# Patient Record
Sex: MALE | Race: WHITE | NOT HISPANIC OR LATINO | URBAN - METROPOLITAN AREA
[De-identification: names, ages, dates, MRNs, and addresses within clinical notes are randomized per-mention and may not be internally consistent; named-entity substitution may affect disease eponyms.]

---

## 2017-09-17 ENCOUNTER — EMERGENCY (EMERGENCY)
Facility: HOSPITAL | Age: 57
LOS: 1 days | Discharge: HOME | End: 2017-09-17
Admitting: EMERGENCY MEDICINE
Payer: MEDICAID

## 2017-09-17 PROCEDURE — 99285 EMERGENCY DEPT VISIT HI MDM: CPT

## 2017-09-17 PROCEDURE — 74176 CT ABD & PELVIS W/O CONTRAST: CPT | Mod: 26

## 2017-09-18 PROCEDURE — 85610 PROTHROMBIN TIME: CPT

## 2017-09-18 PROCEDURE — 74176 CT ABD & PELVIS W/O CONTRAST: CPT

## 2017-09-18 PROCEDURE — 99284 EMERGENCY DEPT VISIT MOD MDM: CPT | Mod: 25

## 2017-09-18 PROCEDURE — 80048 BASIC METABOLIC PNL TOTAL CA: CPT

## 2017-09-18 PROCEDURE — 85027 COMPLETE CBC AUTOMATED: CPT

## 2017-09-18 PROCEDURE — 96374 THER/PROPH/DIAG INJ IV PUSH: CPT

## 2017-09-18 PROCEDURE — 81003 URINALYSIS AUTO W/O SCOPE: CPT

## 2017-09-18 PROCEDURE — 85730 THROMBOPLASTIN TIME PARTIAL: CPT

## 2018-04-18 ENCOUNTER — EMERGENCY (EMERGENCY)
Facility: HOSPITAL | Age: 58
LOS: 1 days | Discharge: HOME | End: 2018-04-18
Admitting: EMERGENCY MEDICINE
Payer: COMMERCIAL

## 2018-04-18 PROCEDURE — 99283 EMERGENCY DEPT VISIT LOW MDM: CPT

## 2018-04-18 PROCEDURE — 73502 X-RAY EXAM HIP UNI 2-3 VIEWS: CPT | Mod: 26,LT

## 2018-04-19 PROCEDURE — 96372 THER/PROPH/DIAG INJ SC/IM: CPT

## 2018-04-19 PROCEDURE — 99283 EMERGENCY DEPT VISIT LOW MDM: CPT | Mod: 25

## 2018-04-19 PROCEDURE — 73502 X-RAY EXAM HIP UNI 2-3 VIEWS: CPT

## 2018-12-24 ENCOUNTER — APPOINTMENT (OUTPATIENT)
Dept: INTERNAL MEDICINE | Facility: CLINIC | Age: 58
End: 2018-12-24
Payer: COMMERCIAL

## 2018-12-24 VITALS
RESPIRATION RATE: 15 BRPM | SYSTOLIC BLOOD PRESSURE: 134 MMHG | HEIGHT: 72 IN | DIASTOLIC BLOOD PRESSURE: 78 MMHG | BODY MASS INDEX: 42.66 KG/M2 | WEIGHT: 315 LBS | HEART RATE: 74 BPM

## 2018-12-24 DIAGNOSIS — Z87.891 PERSONAL HISTORY OF NICOTINE DEPENDENCE: ICD-10-CM

## 2018-12-24 PROCEDURE — 99215 OFFICE O/P EST HI 40 MIN: CPT

## 2018-12-26 ENCOUNTER — APPOINTMENT (OUTPATIENT)
Dept: INTERNAL MEDICINE | Facility: CLINIC | Age: 58
End: 2018-12-26

## 2018-12-26 NOTE — REVIEW OF SYSTEMS
[Hoarseness] : hoarseness [Heartburn] : heartburn [Fever] : no fever [Chills] : no chills [Fatigue] : no fatigue [Hot Flashes] : no hot flashes [Night Sweats] : no night sweats [Recent Change In Weight] : ~T no recent weight change [Discharge] : no discharge [Pain] : no pain [Redness] : no redness [Dryness] : no dryness [Vision Problems] : no vision problems [Itching] : no itching [Earache] : no earache [Hearing Loss] : no hearing loss [Nosebleeds] : no nosebleeds [Postnasal Drip] : no postnasal drip [Nasal Discharge] : no nasal discharge [Sore Throat] : no sore throat [Chest Pain] : no chest pain [Palpitations] : no palpitations [Claudication] : no  leg claudication [Lower Ext Edema] : no lower extremity edema [Orthopena] : no orthopnea [Paroysmal Nocturnal Dyspnea] : no paroysmal nocturnal dyspnea [Shortness Of Breath] : no shortness of breath [Wheezing] : no wheezing [Cough] : no cough [Dyspnea on Exertion] : not dyspnea on exertion [Abdominal Pain] : no abdominal pain [Nausea] : no nausea [Constipation] : no constipation [Diarrhea] : no diarrhea [Vomiting] : no vomiting [Melena] : no melena [Dysuria] : no dysuria [Incontinence] : no incontinence [Hesitancy] : no hesitancy [Nocturia] : no nocturia [Hematuria] : no hematuria [Frequency] : no frequency [Impotence] : no impotency [Poor Libido] : libido not poor [Joint Pain] : no joint pain [Joint Stiffness] : no joint stiffness [Muscle Pain] : no muscle pain [Muscle Weakness] : no muscle weakness [Back Pain] : no back pain [Joint Swelling] : no joint swelling [Mole Changes] : no mole changes [Nail Changes] : no nail changes [Hair Changes] : no hair changes [Skin Rash] : no skin rash [Headache] : no headache [Dizziness] : no dizziness [Fainting] : no fainting [Confusion] : no confusion [Unsteady Walk] : no ataxia [Memory Loss] : no memory loss [Suicidal] : not suicidal [Insomnia] : no insomnia [Anxiety] : no anxiety [Depression] : no depression [Easy Bleeding] : no easy bleeding [Easy Bruising] : no easy bruising [Swollen Glands] : no swollen glands

## 2018-12-26 NOTE — HEALTH RISK ASSESSMENT
[No falls in past year] : Patient reported no falls in the past year [0] : 2) Feeling down, depressed, or hopeless: Not at all (0) [HIV test declined] : HIV test declined [Hepatitis C test declined] : Hepatitis C test declined [[id number]] : 0

## 2018-12-26 NOTE — HISTORY OF PRESENT ILLNESS
[FreeTextEntry1] : Comes to the office for gastroenterology consultation referred by Dr. Monica Wolfe with chief complaint of chronic hoarse voice [de-identified] : 58-year-old man comes to the office for a gastroenterology consultation referred by his primary care provider Dr. Monica Wolfe patient has been troubled by hoarse Voice for several months. His primary care provider has tried medicines to treat allergies without success he does have occasional heartburn referred as to the possibility that his hoarseness is related to GERD denies abdominal pain he said no dysphagia no nausea no vomiting no diarrhea no constipation no primary blood per rectum or black stools denies chest pain shortness of breath exertional dyspnea lightheadedness palpitations dizziness vertigo or syncope denies any significant musculoskeletal complaints

## 2018-12-26 NOTE — ASSESSMENT
[FreeTextEntry1] : Physical exam shows a well-developed man in no acute distress height was 6 feet weight 340 pounds blood pressure 134/78 heart rate 74 respirations at 15. HEENT was unremarkable chest was clear cardiovascular exam was regular abdomen was soft murmur extremities showed no clubbing cyanosis or edema neurologic exam was nonfocal.............. patient recommended initially to see an ear nose and throat physician to evaluate the vocal cords and the pharynx long discussion about the triggers for acid reflux including eating too fast eating late at night poor dietary choices limiting caffeine chocolate citrus fruits and juices and spicy and fried foods he will try to make some improvement in these areas but in the meantime we have started pantoprazole 40 mg twice a day and he will return to us in 2-3 weeks tentative endoscopy has been scheduled for mid January

## 2019-01-11 ENCOUNTER — APPOINTMENT (OUTPATIENT)
Dept: INTERNAL MEDICINE | Facility: CLINIC | Age: 59
End: 2019-01-11

## 2019-01-23 ENCOUNTER — APPOINTMENT (OUTPATIENT)
Dept: INTERNAL MEDICINE | Facility: HOSPITAL | Age: 59
End: 2019-01-23

## 2019-01-25 ENCOUNTER — APPOINTMENT (OUTPATIENT)
Dept: OTOLARYNGOLOGY | Facility: CLINIC | Age: 59
End: 2019-01-25
Payer: MEDICAID

## 2019-01-25 VITALS
HEART RATE: 105 BPM | BODY MASS INDEX: 42.66 KG/M2 | HEIGHT: 72 IN | OXYGEN SATURATION: 96 % | WEIGHT: 315 LBS | RESPIRATION RATE: 16 BRPM

## 2019-01-25 PROCEDURE — 31575 DIAGNOSTIC LARYNGOSCOPY: CPT

## 2019-01-25 PROCEDURE — 99204 OFFICE O/P NEW MOD 45 MIN: CPT | Mod: 25

## 2019-01-27 NOTE — HISTORY OF PRESENT ILLNESS
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

## 2019-01-27 NOTE — REASON FOR VISIT
[Initial Consultation] : an initial consultation for [FreeTextEntry2] : new patient horseness in the throat for the past 5 months

## 2019-01-27 NOTE — CONSULT LETTER
[Dear  ___] : Dear  [unfilled], [Consult Letter:] : I had the pleasure of evaluating your patient, [unfilled]. [Please see my note below.] : Please see my note below. [Consult Closing:] : Thank you very much for allowing me to participate in the care of this patient.  If you have any questions, please do not hesitate to contact me. [Sincerely,] : Sincerely, [FreeTextEntry2] : Dr Enid Rodriguez. [FreeTextEntry3] : \par Jay Camara MD, FACS\par \par Otolaryngology-Head and Neck Surgery\par [employer] [facility]\par

## 2019-02-01 ENCOUNTER — OUTPATIENT (OUTPATIENT)
Dept: OUTPATIENT SERVICES | Facility: HOSPITAL | Age: 59
LOS: 1 days | End: 2019-02-01
Payer: MEDICAID

## 2019-02-01 VITALS
RESPIRATION RATE: 16 BRPM | OXYGEN SATURATION: 98 % | TEMPERATURE: 98 F | HEART RATE: 95 BPM | HEIGHT: 70.25 IN | DIASTOLIC BLOOD PRESSURE: 78 MMHG | SYSTOLIC BLOOD PRESSURE: 156 MMHG | WEIGHT: 315 LBS

## 2019-02-01 DIAGNOSIS — J38.3 OTHER DISEASES OF VOCAL CORDS: ICD-10-CM

## 2019-02-01 DIAGNOSIS — E11.9 TYPE 2 DIABETES MELLITUS WITHOUT COMPLICATIONS: ICD-10-CM

## 2019-02-01 DIAGNOSIS — K08.409 PARTIAL LOSS OF TEETH, UNSPECIFIED CAUSE, UNSPECIFIED CLASS: Chronic | ICD-10-CM

## 2019-02-01 DIAGNOSIS — I10 ESSENTIAL (PRIMARY) HYPERTENSION: ICD-10-CM

## 2019-02-01 DIAGNOSIS — E66.01 MORBID (SEVERE) OBESITY DUE TO EXCESS CALORIES: ICD-10-CM

## 2019-02-01 DIAGNOSIS — Z98.890 OTHER SPECIFIED POSTPROCEDURAL STATES: Chronic | ICD-10-CM

## 2019-02-01 LAB
ANION GAP SERPL CALC-SCNC: 11 MMO/L — SIGNIFICANT CHANGE UP (ref 7–14)
BUN SERPL-MCNC: 11 MG/DL — SIGNIFICANT CHANGE UP (ref 7–23)
CALCIUM SERPL-MCNC: 10 MG/DL — SIGNIFICANT CHANGE UP (ref 8.4–10.5)
CHLORIDE SERPL-SCNC: 102 MMOL/L — SIGNIFICANT CHANGE UP (ref 98–107)
CO2 SERPL-SCNC: 27 MMOL/L — SIGNIFICANT CHANGE UP (ref 22–31)
CREAT SERPL-MCNC: 0.77 MG/DL — SIGNIFICANT CHANGE UP (ref 0.5–1.3)
GLUCOSE SERPL-MCNC: 197 MG/DL — HIGH (ref 70–99)
HBA1C BLD-MCNC: 6.7 % — HIGH (ref 4–5.6)
HCT VFR BLD CALC: 43 % — SIGNIFICANT CHANGE UP (ref 39–50)
HGB BLD-MCNC: 14.1 G/DL — SIGNIFICANT CHANGE UP (ref 13–17)
MCHC RBC-ENTMCNC: 29.5 PG — SIGNIFICANT CHANGE UP (ref 27–34)
MCHC RBC-ENTMCNC: 32.8 % — SIGNIFICANT CHANGE UP (ref 32–36)
MCV RBC AUTO: 90 FL — SIGNIFICANT CHANGE UP (ref 80–100)
NRBC # FLD: 0 K/UL — LOW (ref 25–125)
PLATELET # BLD AUTO: 186 K/UL — SIGNIFICANT CHANGE UP (ref 150–400)
PMV BLD: 12.3 FL — SIGNIFICANT CHANGE UP (ref 7–13)
POTASSIUM SERPL-MCNC: 4.7 MMOL/L — SIGNIFICANT CHANGE UP (ref 3.5–5.3)
POTASSIUM SERPL-SCNC: 4.7 MMOL/L — SIGNIFICANT CHANGE UP (ref 3.5–5.3)
RBC # BLD: 4.78 M/UL — SIGNIFICANT CHANGE UP (ref 4.2–5.8)
RBC # FLD: 12.6 % — SIGNIFICANT CHANGE UP (ref 10.3–14.5)
SODIUM SERPL-SCNC: 140 MMOL/L — SIGNIFICANT CHANGE UP (ref 135–145)
WBC # BLD: 7.7 K/UL — SIGNIFICANT CHANGE UP (ref 3.8–10.5)
WBC # FLD AUTO: 7.7 K/UL — SIGNIFICANT CHANGE UP (ref 3.8–10.5)

## 2019-02-01 PROCEDURE — 93010 ELECTROCARDIOGRAM REPORT: CPT

## 2019-02-01 RX ORDER — SODIUM CHLORIDE 9 MG/ML
3 INJECTION INTRAMUSCULAR; INTRAVENOUS; SUBCUTANEOUS EVERY 8 HOURS
Qty: 0 | Refills: 0 | Status: DISCONTINUED | OUTPATIENT
Start: 2019-02-16 | End: 2019-03-03

## 2019-02-01 RX ORDER — SODIUM CHLORIDE 9 MG/ML
1000 INJECTION, SOLUTION INTRAVENOUS
Qty: 0 | Refills: 0 | Status: DISCONTINUED | OUTPATIENT
Start: 2019-02-16 | End: 2019-03-03

## 2019-02-01 NOTE — H&P PST ADULT - PROBLEM SELECTOR PLAN 1
Direct Laryngoscopy with Biopsy scheduled on 2/13/19.   Pre-op instructions provided. Pt verbalized understanding.   Pepcid provided for GI prophylaxis.

## 2019-02-01 NOTE — H&P PST ADULT - NSANTHOSAYNRD_GEN_A_CORE
No. SALVATORE screening performed.  STOP BANG Legend: 0-2 = LOW Risk; 3-4 = INTERMEDIATE Risk; 5-8 = HIGH Risk

## 2019-02-01 NOTE — H&P PST ADULT - HISTORY OF PRESENT ILLNESS
58 year old male with c/o hoarseness of voice x 6 months, had laryngoscopy which revealed leukoplasia. Pt presents today for presurgical evaluation for ... 58 year old male with c/o hoarseness of voice x 6 months, had laryngoscopy which revealed leukoplasia. Pt presents today for presurgical evaluation for Direct Laryngoscopy with Biopsy scheduled on 2/13/19.

## 2019-02-11 ENCOUNTER — RESULT REVIEW (OUTPATIENT)
Age: 59
End: 2019-02-11

## 2019-02-15 ENCOUNTER — TRANSCRIPTION ENCOUNTER (OUTPATIENT)
Age: 59
End: 2019-02-15

## 2019-02-16 ENCOUNTER — OUTPATIENT (OUTPATIENT)
Dept: OUTPATIENT SERVICES | Facility: HOSPITAL | Age: 59
LOS: 1 days | Discharge: HOME | End: 2019-02-16
Payer: MEDICAID

## 2019-02-16 ENCOUNTER — APPOINTMENT (OUTPATIENT)
Dept: OTOLARYNGOLOGY | Facility: HOSPITAL | Age: 59
End: 2019-02-16

## 2019-02-16 VITALS
HEIGHT: 70.25 IN | RESPIRATION RATE: 15 BRPM | WEIGHT: 315 LBS | TEMPERATURE: 98 F | OXYGEN SATURATION: 95 % | HEART RATE: 102 BPM | DIASTOLIC BLOOD PRESSURE: 77 MMHG | SYSTOLIC BLOOD PRESSURE: 145 MMHG

## 2019-02-16 VITALS
DIASTOLIC BLOOD PRESSURE: 77 MMHG | SYSTOLIC BLOOD PRESSURE: 141 MMHG | RESPIRATION RATE: 12 BRPM | HEART RATE: 97 BPM | OXYGEN SATURATION: 95 %

## 2019-02-16 DIAGNOSIS — J38.3 OTHER DISEASES OF VOCAL CORDS: ICD-10-CM

## 2019-02-16 DIAGNOSIS — K08.409 PARTIAL LOSS OF TEETH, UNSPECIFIED CAUSE, UNSPECIFIED CLASS: Chronic | ICD-10-CM

## 2019-02-16 DIAGNOSIS — Z98.890 OTHER SPECIFIED POSTPROCEDURAL STATES: Chronic | ICD-10-CM

## 2019-02-16 PROCEDURE — 31536 LARYNGOSCOPY W/BX & OP SCOPE: CPT

## 2019-02-16 RX ORDER — ACETAMINOPHEN 500 MG
2 TABLET ORAL
Qty: 0 | Refills: 0 | DISCHARGE
Start: 2019-02-16

## 2019-02-16 RX ORDER — ACETAMINOPHEN 500 MG
650 TABLET ORAL EVERY 6 HOURS
Qty: 0 | Refills: 0 | Status: DISCONTINUED | OUTPATIENT
Start: 2019-02-16 | End: 2019-03-03

## 2019-02-16 NOTE — ASU DISCHARGE PLAN (ADULT/PEDIATRIC). - NOTIFY
Pain not relieved by Medications/Inability to Tolerate Liquids or Foods/Swelling that continues/Persistent Nausea and Vomiting/Fever greater than 101/Bleeding that does not stop

## 2019-02-16 NOTE — ASU DISCHARGE PLAN (ADULT/PEDIATRIC). - SPECIAL INSTRUCTIONS
Regular diet.   Tylenol or motrin for pain control as needed.   Continue home medications  Follow up with Dr. Camara in 1-2 weeks to discuss results.

## 2019-02-16 NOTE — ASU DISCHARGE PLAN (ADULT/PEDIATRIC). - MEDICATION SUMMARY - MEDICATIONS TO TAKE
I will START or STAY ON the medications listed below when I get home from the hospital:    acetaminophen 325 mg oral tablet  -- 2 tab(s) by mouth every 6 hours, As needed, Mild Pain (1 - 3), Moderate Pain (4 - 6), Severe Pain (7 - 10)  -- Indication: For pain control    lisinopril 40 mg oral tablet  -- 1 tab(s) by mouth once a day in am  -- Indication: For home med    Victoza 18 mg/3 mL subcutaneous solution  -- subcutaneous once a day in am  -- Indication: For home med    amLODIPine 10 mg oral tablet  -- 1 tab(s) by mouth once a day in  am  -- Indication: For home med

## 2019-02-17 ENCOUNTER — RESULT REVIEW (OUTPATIENT)
Age: 59
End: 2019-02-17

## 2019-02-17 PROCEDURE — 88305 TISSUE EXAM BY PATHOLOGIST: CPT | Mod: 26

## 2019-02-20 PROBLEM — I10 ESSENTIAL (PRIMARY) HYPERTENSION: Chronic | Status: ACTIVE | Noted: 2019-02-01

## 2019-02-20 PROBLEM — E11.9 TYPE 2 DIABETES MELLITUS WITHOUT COMPLICATIONS: Chronic | Status: ACTIVE | Noted: 2019-02-01

## 2019-02-20 PROBLEM — E66.01 MORBID (SEVERE) OBESITY DUE TO EXCESS CALORIES: Chronic | Status: ACTIVE | Noted: 2019-02-01

## 2019-02-28 ENCOUNTER — APPOINTMENT (OUTPATIENT)
Dept: OTOLARYNGOLOGY | Facility: CLINIC | Age: 59
End: 2019-02-28
Payer: MEDICAID

## 2019-02-28 ENCOUNTER — OUTPATIENT (OUTPATIENT)
Dept: OUTPATIENT SERVICES | Facility: HOSPITAL | Age: 59
LOS: 1 days | Discharge: HOME | End: 2019-02-28

## 2019-02-28 VITALS
BODY MASS INDEX: 42.66 KG/M2 | WEIGHT: 315 LBS | HEART RATE: 102 BPM | DIASTOLIC BLOOD PRESSURE: 83 MMHG | SYSTOLIC BLOOD PRESSURE: 145 MMHG | HEIGHT: 72 IN

## 2019-02-28 DIAGNOSIS — Z98.890 OTHER SPECIFIED POSTPROCEDURAL STATES: Chronic | ICD-10-CM

## 2019-02-28 DIAGNOSIS — K08.409 PARTIAL LOSS OF TEETH, UNSPECIFIED CAUSE, UNSPECIFIED CLASS: Chronic | ICD-10-CM

## 2019-02-28 PROCEDURE — 31575 DIAGNOSTIC LARYNGOSCOPY: CPT

## 2019-02-28 PROCEDURE — 99213 OFFICE O/P EST LOW 20 MIN: CPT | Mod: 25

## 2019-02-28 RX ORDER — NALTREXONE HYDROCHLORIDE AND BUPROPION HYDROCHLORIDE 8; 90 MG/1; MG/1
8-90 TABLET, EXTENDED RELEASE ORAL
Qty: 120 | Refills: 0 | Status: COMPLETED | COMMUNITY
Start: 2018-10-30 | End: 2019-02-28

## 2019-03-03 NOTE — HISTORY OF PRESENT ILLNESS
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 Feels like voice is slightly worse after the procedure. Tolerating PO diet.\par Complete review of systems which was performed during a previous encounter was reviewed with the patient and there are no changes except as stated in the HPI section.\par

## 2019-03-03 NOTE — CONSULT LETTER
[Dear  ___] : Dear  [unfilled], [Courtesy Letter:] : I had the pleasure of seeing your patient, [unfilled], in my office today. [Please see my note below.] : Please see my note below. [Consult Closing:] : Thank you very much for allowing me to participate in the care of this patient.  If you have any questions, please do not hesitate to contact me. [Sincerely,] : Sincerely, [FreeTextEntry2] : Dr Enid Rodriguez.  [FreeTextEntry3] : \par Jay Camara MD, FACS\par \par Otolaryngology-Head and Neck Surgery\par [employer] [facility]\par

## 2019-03-03 NOTE — REASON FOR VISIT
[Subsequent Evaluation] : a subsequent evaluation for [FreeTextEntry2] : s/p DL bx of L VC on 2/17/19

## 2019-03-11 DIAGNOSIS — C32.9 MALIGNANT NEOPLASM OF LARYNX, UNSPECIFIED: ICD-10-CM

## 2019-03-26 ENCOUNTER — APPOINTMENT (OUTPATIENT)
Dept: OTOLARYNGOLOGY | Facility: CLINIC | Age: 59
End: 2019-03-26

## 2020-07-15 ENCOUNTER — APPOINTMENT (OUTPATIENT)
Dept: SURGERY | Facility: CLINIC | Age: 60
End: 2020-07-15
Payer: MEDICAID

## 2020-07-15 DIAGNOSIS — Z12.11 ENCOUNTER FOR SCREENING FOR MALIGNANT NEOPLASM OF COLON: ICD-10-CM

## 2020-07-15 PROCEDURE — 99203 OFFICE O/P NEW LOW 30 MIN: CPT

## 2020-07-15 NOTE — HISTORY OF PRESENT ILLNESS
[de-identified] : This 59 year old man last underwent a colonoscopy over seven years ago. There is no FH of colon CA. The patient recently developed several days of crampy abdominal pain.

## 2020-07-15 NOTE — PHYSICAL EXAM
[Normal Breath Sounds] : Normal breath sounds [Normal Heart Sounds] : normal heart sounds [Normal Rate and Rhythm] : normal rate and rhythm [Abdominal Masses] : No abdominal masses [Abdomen Tenderness] : ~T ~M No abdominal tenderness [No Rash or Lesion] : No rash or lesion [de-identified] : nl [de-identified] : nl [de-identified] : nl

## 2020-07-15 NOTE — ASSESSMENT
[FreeTextEntry1] : Long discussion regarding all options and risks\par Bowel prep written and explained\par Scheduled for colonoscopy on 7/28/20\par

## 2020-07-25 ENCOUNTER — LABORATORY RESULT (OUTPATIENT)
Age: 60
End: 2020-07-25

## 2020-07-27 ENCOUNTER — TRANSCRIPTION ENCOUNTER (OUTPATIENT)
Age: 60
End: 2020-07-27

## 2020-07-28 ENCOUNTER — OUTPATIENT (OUTPATIENT)
Dept: OUTPATIENT SERVICES | Facility: HOSPITAL | Age: 60
LOS: 1 days | End: 2020-07-28
Payer: MEDICAID

## 2020-07-28 DIAGNOSIS — K08.409 PARTIAL LOSS OF TEETH, UNSPECIFIED CAUSE, UNSPECIFIED CLASS: Chronic | ICD-10-CM

## 2020-07-28 DIAGNOSIS — Z98.890 OTHER SPECIFIED POSTPROCEDURAL STATES: Chronic | ICD-10-CM

## 2020-07-28 DIAGNOSIS — Z12.11 ENCOUNTER FOR SCREENING FOR MALIGNANT NEOPLASM OF COLON: ICD-10-CM

## 2020-07-28 LAB — GLUCOSE BLDC GLUCOMTR-MCNC: 131 MG/DL — HIGH (ref 70–99)

## 2020-07-28 PROCEDURE — 45378 DIAGNOSTIC COLONOSCOPY: CPT | Mod: 33

## 2020-07-28 PROCEDURE — 82962 GLUCOSE BLOOD TEST: CPT

## 2020-07-28 PROCEDURE — G0121: CPT

## 2020-07-28 RX ORDER — SODIUM CHLORIDE 9 MG/ML
1000 INJECTION INTRAMUSCULAR; INTRAVENOUS; SUBCUTANEOUS
Refills: 0 | Status: DISCONTINUED | OUTPATIENT
Start: 2020-07-28 | End: 2020-08-12

## 2020-07-28 RX ADMIN — SODIUM CHLORIDE 75 MILLILITER(S): 9 INJECTION INTRAMUSCULAR; INTRAVENOUS; SUBCUTANEOUS at 12:49

## 2020-12-23 PROBLEM — Z12.11 ENCOUNTER FOR SCREENING COLONOSCOPY: Status: RESOLVED | Noted: 2020-07-15 | Resolved: 2020-12-23

## 2021-03-22 NOTE — H&P PST ADULT - NEGATIVE SKIN SYMPTOMS
Pt's daughter called to inquire about details of what single agent pembro administration is like. Also asking about FMLA forms for her sister. Forms were faxed on 3/3, will verify # and resend. Discussed admin with Youa and will email via oncology information.  Got fax # of [phone] from [employer]. Answered all questions about Keytruda administration.   no change in size/color of mole/no rash/no itching/no dryness

## 2021-05-21 ENCOUNTER — EMERGENCY (EMERGENCY)
Facility: HOSPITAL | Age: 61
LOS: 1 days | Discharge: HOME | End: 2021-05-21
Attending: INTERNAL MEDICINE | Admitting: INTERNAL MEDICINE
Payer: MEDICAID

## 2021-05-21 VITALS
DIASTOLIC BLOOD PRESSURE: 91 MMHG | OXYGEN SATURATION: 97 % | WEIGHT: 315 LBS | TEMPERATURE: 98 F | SYSTOLIC BLOOD PRESSURE: 152 MMHG | HEIGHT: 70.25 IN | HEART RATE: 115 BPM | RESPIRATION RATE: 20 BRPM

## 2021-05-21 VITALS — HEART RATE: 101 BPM | DIASTOLIC BLOOD PRESSURE: 69 MMHG | SYSTOLIC BLOOD PRESSURE: 129 MMHG | TEMPERATURE: 99 F

## 2021-05-21 DIAGNOSIS — Z98.890 OTHER SPECIFIED POSTPROCEDURAL STATES: Chronic | ICD-10-CM

## 2021-05-21 DIAGNOSIS — K08.409 PARTIAL LOSS OF TEETH, UNSPECIFIED CAUSE, UNSPECIFIED CLASS: Chronic | ICD-10-CM

## 2021-05-21 LAB
ALBUMIN SERPL ELPH-MCNC: 3.7 G/DL — SIGNIFICANT CHANGE UP (ref 3.3–5)
ALP SERPL-CCNC: 106 U/L — SIGNIFICANT CHANGE UP (ref 40–120)
ALT FLD-CCNC: 68 U/L — HIGH (ref 10–45)
ANION GAP SERPL CALC-SCNC: 10 MMOL/L — SIGNIFICANT CHANGE UP (ref 5–17)
APPEARANCE UR: CLEAR — SIGNIFICANT CHANGE UP
AST SERPL-CCNC: 58 U/L — HIGH (ref 10–40)
BASOPHILS # BLD AUTO: 0.07 K/UL — SIGNIFICANT CHANGE UP (ref 0–0.2)
BASOPHILS NFR BLD AUTO: 0.7 % — SIGNIFICANT CHANGE UP (ref 0–2)
BILIRUB SERPL-MCNC: 0.4 MG/DL — SIGNIFICANT CHANGE UP (ref 0.2–1.2)
BILIRUB UR-MCNC: NEGATIVE — SIGNIFICANT CHANGE UP
BUN SERPL-MCNC: 12 MG/DL — SIGNIFICANT CHANGE UP (ref 7–23)
CALCIUM SERPL-MCNC: 9.3 MG/DL — SIGNIFICANT CHANGE UP (ref 8.4–10.5)
CHLORIDE SERPL-SCNC: 103 MMOL/L — SIGNIFICANT CHANGE UP (ref 96–108)
CO2 SERPL-SCNC: 27 MMOL/L — SIGNIFICANT CHANGE UP (ref 22–31)
COLOR SPEC: YELLOW — SIGNIFICANT CHANGE UP
CREAT SERPL-MCNC: 0.85 MG/DL — SIGNIFICANT CHANGE UP (ref 0.5–1.3)
DIFF PNL FLD: NEGATIVE — SIGNIFICANT CHANGE UP
EOSINOPHIL # BLD AUTO: 0.13 K/UL — SIGNIFICANT CHANGE UP (ref 0–0.5)
EOSINOPHIL NFR BLD AUTO: 1.4 % — SIGNIFICANT CHANGE UP (ref 0–6)
GLUCOSE SERPL-MCNC: 207 MG/DL — HIGH (ref 70–99)
GLUCOSE UR QL: 50 MG/DL
HCT VFR BLD CALC: 44.4 % — SIGNIFICANT CHANGE UP (ref 39–50)
HGB BLD-MCNC: 14.8 G/DL — SIGNIFICANT CHANGE UP (ref 13–17)
IMM GRANULOCYTES NFR BLD AUTO: 0.4 % — SIGNIFICANT CHANGE UP (ref 0–1.5)
KETONES UR-MCNC: NEGATIVE — SIGNIFICANT CHANGE UP
LEUKOCYTE ESTERASE UR-ACNC: NEGATIVE — SIGNIFICANT CHANGE UP
LYMPHOCYTES # BLD AUTO: 1.22 K/UL — SIGNIFICANT CHANGE UP (ref 1–3.3)
LYMPHOCYTES # BLD AUTO: 13 % — SIGNIFICANT CHANGE UP (ref 13–44)
MCHC RBC-ENTMCNC: 29.9 PG — SIGNIFICANT CHANGE UP (ref 27–34)
MCHC RBC-ENTMCNC: 33.3 GM/DL — SIGNIFICANT CHANGE UP (ref 32–36)
MCV RBC AUTO: 89.7 FL — SIGNIFICANT CHANGE UP (ref 80–100)
MONOCYTES # BLD AUTO: 0.68 K/UL — SIGNIFICANT CHANGE UP (ref 0–0.9)
MONOCYTES NFR BLD AUTO: 7.2 % — SIGNIFICANT CHANGE UP (ref 2–14)
NEUTROPHILS # BLD AUTO: 7.24 K/UL — SIGNIFICANT CHANGE UP (ref 1.8–7.4)
NEUTROPHILS NFR BLD AUTO: 77.3 % — HIGH (ref 43–77)
NITRITE UR-MCNC: NEGATIVE — SIGNIFICANT CHANGE UP
NRBC # BLD: 0 /100 WBCS — SIGNIFICANT CHANGE UP (ref 0–0)
NT-PROBNP SERPL-SCNC: 131 PG/ML — SIGNIFICANT CHANGE UP (ref 0–300)
PH UR: 6 — SIGNIFICANT CHANGE UP (ref 5–8)
PLATELET # BLD AUTO: 223 K/UL — SIGNIFICANT CHANGE UP (ref 150–400)
POTASSIUM SERPL-MCNC: 4.3 MMOL/L — SIGNIFICANT CHANGE UP (ref 3.5–5.3)
POTASSIUM SERPL-SCNC: 4.3 MMOL/L — SIGNIFICANT CHANGE UP (ref 3.5–5.3)
PROT SERPL-MCNC: 7.6 G/DL — SIGNIFICANT CHANGE UP (ref 6–8.3)
PROT UR-MCNC: NEGATIVE — SIGNIFICANT CHANGE UP
RBC # BLD: 4.95 M/UL — SIGNIFICANT CHANGE UP (ref 4.2–5.8)
RBC # FLD: 12 % — SIGNIFICANT CHANGE UP (ref 10.3–14.5)
SARS-COV-2 RNA SPEC QL NAA+PROBE: SIGNIFICANT CHANGE UP
SODIUM SERPL-SCNC: 140 MMOL/L — SIGNIFICANT CHANGE UP (ref 135–145)
SP GR SPEC: 1.02 — SIGNIFICANT CHANGE UP (ref 1.01–1.02)
TROPONIN I SERPL-MCNC: <.017 NG/ML — LOW (ref 0.02–0.06)
TROPONIN I SERPL-MCNC: <.017 NG/ML — LOW (ref 0.02–0.06)
UROBILINOGEN FLD QL: NEGATIVE — SIGNIFICANT CHANGE UP
WBC # BLD: 9.38 K/UL — SIGNIFICANT CHANGE UP (ref 3.8–10.5)
WBC # FLD AUTO: 9.38 K/UL — SIGNIFICANT CHANGE UP (ref 3.8–10.5)

## 2021-05-21 PROCEDURE — 93010 ELECTROCARDIOGRAM REPORT: CPT

## 2021-05-21 PROCEDURE — 71045 X-RAY EXAM CHEST 1 VIEW: CPT

## 2021-05-21 PROCEDURE — 71275 CT ANGIOGRAPHY CHEST: CPT

## 2021-05-21 PROCEDURE — 71275 CT ANGIOGRAPHY CHEST: CPT | Mod: 26,MA

## 2021-05-21 PROCEDURE — 99284 EMERGENCY DEPT VISIT MOD MDM: CPT | Mod: 25

## 2021-05-21 PROCEDURE — 87635 SARS-COV-2 COVID-19 AMP PRB: CPT

## 2021-05-21 PROCEDURE — 80053 COMPREHEN METABOLIC PANEL: CPT

## 2021-05-21 PROCEDURE — 36415 COLL VENOUS BLD VENIPUNCTURE: CPT

## 2021-05-21 PROCEDURE — 93005 ELECTROCARDIOGRAM TRACING: CPT

## 2021-05-21 PROCEDURE — 84484 ASSAY OF TROPONIN QUANT: CPT

## 2021-05-21 PROCEDURE — 83880 ASSAY OF NATRIURETIC PEPTIDE: CPT

## 2021-05-21 PROCEDURE — 71045 X-RAY EXAM CHEST 1 VIEW: CPT | Mod: 26

## 2021-05-21 PROCEDURE — 99285 EMERGENCY DEPT VISIT HI MDM: CPT

## 2021-05-21 PROCEDURE — 85025 COMPLETE CBC W/AUTO DIFF WBC: CPT

## 2021-05-21 PROCEDURE — 96374 THER/PROPH/DIAG INJ IV PUSH: CPT | Mod: XU

## 2021-05-21 RX ORDER — METOPROLOL TARTRATE 50 MG
1 TABLET ORAL
Qty: 30 | Refills: 0
Start: 2021-05-21 | End: 2021-06-19

## 2021-05-21 RX ORDER — METOPROLOL TARTRATE 50 MG
50 TABLET ORAL DAILY
Refills: 0 | Status: DISCONTINUED | OUTPATIENT
Start: 2021-05-21 | End: 2021-05-25

## 2021-05-21 RX ORDER — LISINOPRIL 2.5 MG/1
1 TABLET ORAL
Qty: 0 | Refills: 0 | DISCHARGE

## 2021-05-21 RX ORDER — LIRAGLUTIDE 6 MG/ML
0 INJECTION SUBCUTANEOUS
Qty: 0 | Refills: 0 | DISCHARGE

## 2021-05-21 RX ORDER — DILTIAZEM HCL 120 MG
20 CAPSULE, EXT RELEASE 24 HR ORAL ONCE
Refills: 0 | Status: COMPLETED | OUTPATIENT
Start: 2021-05-21 | End: 2021-05-21

## 2021-05-21 RX ORDER — RIVAROXABAN 15 MG-20MG
1 KIT ORAL
Qty: 30 | Refills: 0
Start: 2021-05-21 | End: 2021-06-19

## 2021-05-21 RX ORDER — AMLODIPINE BESYLATE 2.5 MG/1
1 TABLET ORAL
Qty: 0 | Refills: 0 | DISCHARGE

## 2021-05-21 RX ORDER — RIVAROXABAN 15 MG-20MG
20 KIT ORAL ONCE
Refills: 0 | Status: COMPLETED | OUTPATIENT
Start: 2021-05-21 | End: 2021-05-21

## 2021-05-21 RX ADMIN — Medication 20 MILLIGRAM(S): at 14:24

## 2021-05-21 RX ADMIN — Medication 50 MILLIGRAM(S): at 16:09

## 2021-05-21 RX ADMIN — RIVAROXABAN 20 MILLIGRAM(S): KIT at 16:09

## 2021-05-21 NOTE — ED ADULT NURSE NOTE - OBJECTIVE STATEMENT
59yo male c/o SOB and chest discomfort that began this morning when he awoke. Pt denies n/v/d/, fever, diaphoresis

## 2021-05-21 NOTE — ED PROVIDER NOTE - ATTENDING CONTRIBUTION TO CARE
pt 59 yo m DM, HTN c/o nausea yesterday with intermittent lightheadedness and today woke up this AM with chest pressure and SOB which is improved but still not feeling at baseline. took 3 asa 81mg PTA  denies fever, chills, vomiting, weakness, numbness, tingling, headache, neck pain, vision changes. had this 5 years ago and was hospitalized but workup was wnl  new onset afib. heart score 5. will admit after ED evaluation  Dr. Keita:  I have reviewed and discussed with the PA/ resident the case specifics, including the history, physical assessment, evaluation, conclusion, laboratory results, and medical plan. I agree with the contents, and conclusions. I have personally examined, and interviewed the patient.

## 2021-05-21 NOTE — ED PROVIDER NOTE - CLINICAL SUMMARY MEDICAL DECISION MAKING FREE TEXT BOX
pt 59 yo m DM, HTN c/o nausea yesterday with intermittent lightheadedness and today woke up this AM with chest pressure and SOB which is improved but still not feeling at baseline. took 3 asa 81mg PTA  denies fever, chills, vomiting, weakness, numbness, tingling, headache, neck pain, vision changes. had this 5 years ago and was hospitalized but workup was wnl  new onset afib. heart score 5. will admit after ED evaluation

## 2021-05-21 NOTE — ED ADULT NURSE NOTE - NSIMPLEMENTINTERV_GEN_ALL_ED
Implemented All Universal Safety Interventions:  [place] to call system. Call bell, personal items and telephone within reach. Instruct patient to call for assistance. Room bathroom lighting operational. Non-slip footwear when patient is off stretcher. Physically safe environment: no spills, clutter or unnecessary equipment. Stretcher in lowest position, wheels locked, appropriate side rails in place.

## 2021-05-21 NOTE — ED PROVIDER NOTE - PHYSICAL EXAMINATION
General:     Morbidly obese, NAD, well-nourished, well-appearing  Eyes: PERRL  Head:     NC/AT, EOMI, oral mucosa moist  Neck:     trachea midline  Lungs:     CTA b/l  CVS:     tachycardia, irregular rhythm   Abd:     +BS, s/nt/nd  Ext:   no deformities   Neuro: AAOx3, normal gait

## 2021-05-21 NOTE — ED PROVIDER NOTE - PATIENT PORTAL LINK FT
You can access the FollowMyHealth Patient Portal offered by [facility] by registering at the following website: [email or web address]. By joining [facility]’s FollowMyHealth portal, you will also be able to view your health information using other applications (apps) compatible with our system.

## 2021-05-21 NOTE — ED PROVIDER NOTE - OBJECTIVE STATEMENT
pt 61 yo m DM, HTN c/o nausea yesterday with intermittent lightheadedness and today woke up this AM with chest pressure and SOB which is improved but still not feeling at baseline. took 3 asa 81mg PTA  denies fever, chills, vomiting, weakness, numbness, tingling, headache, neck pain, vision changes. had this 5 years ago and was hospitalized but workup was wnl

## 2021-05-21 NOTE — ED PROVIDER NOTE - PROGRESS NOTE DETAILS
The pt is clinically sober, A&Ox3. Throughout our interactions in the ED today, the pt has demonstrated concrete thinking/reasoning, has maintained an orderly/reasonable conversation, appears to have intact insight/judgment/reason, a sound mind and therefore in our opinion has capacity now to make decisions. Given the pt’s presentation, we communicated (in laymen's terms) our concern for ACS. The pt verbalized an understanding of our worries. We have communicated to the patient that the ED evaluation is incomplete & many troublesome conditions haven’t been r/o. We have discussed the need for further ED w/u so we can get more information about the pt’s condition. We have discussed the range of possible dx, potential testing & tx options. Our discussions included the potential outcomes of leaving AMA, including worsening of their condition, becoming permanently disabled/in pain/critically ill, or death. Despite these efforts, we were unable to convince the pt to stay. The pt is refusing any further care and is leaving against medical advice. We have attempted to offer tx/rx/guidance for any dangerous conditions which are most likely and/or dangerous. We have answered all questions and have implored the pt to return ASAP to complete the w/u

## 2021-05-21 NOTE — ED PROVIDER NOTE - NSFOLLOWUPINSTRUCTIONS_ED_ALL_ED_FT
Follow up with your primary care doctor and cardiologist (referral list provided) within 48-72 hours. Show copies of your reports given to you.   Take Xarelto as directed. Take metoprolol as directed  Take all of your other medications as previously prescribed. Worsening or continued chest pain, shortness of breath, weakness,  or any other concerns return to ED.     Chest Pain    Chest pain can be caused by many different conditions which may or may not be dangerous. Causes include heartburn, lung infections, heart attack, blood clot in lungs, skin infections, strain or damage to muscle, cartilage, or bones, etc. In addition to a history and physical examination, an electrocardiogram (ECG) or other lab tests may have been performed to determine the cause of your chest pain. Follow up with your primary care provider or with a cardiologist as instructed.     SEEK IMMEDIATE MEDICAL CARE IF YOU HAVE ANY OF THE FOLLOWING SYMPTOMS: worsening chest pain, coughing up blood, unexplained back/neck/jaw pain, severe abdominal pain, dizziness or lightheadedness, fainting, shortness of breath, sweaty or clammy skin, vomiting, or racing heart beat. These symptoms may represent a serious problem that is an emergency. Do not wait to see if the symptoms will go away. Get medical help right away. Call 911 and do not drive yourself to the hospital.     Atrial Fibrillation    Atrial fibrillation is a type of irregular heartbeat (arrhythmia) where the heart quivers continuously in a chaotic pattern that makes the heart unable to pump blood normally. This can increase the risk for stroke, heart failure, and other heart-related conditions. Atrial fibrillation can be caused by a variety of conditions and may be temporary, intermittent, or permanent. Symptoms include feeling that your heart is beating rapidly or irregularly, chest discomfort, shortness of breath, or dizziness/lightheadedness that may be worse with exertion. Treatment is varied but may involve medication or electrical shock (cardioversion).    SEEK IMMEDIATE MEDICAL CARE IF YOU HAVE ANY OF THE FOLLOWING SYMPTOMS: chest pain, shortness of breath, abdominal pain, sweating, vomiting, blood in vomit/bowel movements/urine, dizziness/lightheadedness, weakness or numbness to face/arm/leg, trouble speaking or understanding, facial droop.        Bleeding Precautions When on Anticoagulant Therapy, Adult      Anticoagulant therapy, also called blood thinner therapy, is medicine that helps to prevent and treat blood clots. The medicine works by stopping blood clots from forming or growing. Blood clots that form in your blood vessels can be dangerous. They can break loose and travel to the heart, lungs, or brain. This increases the risk of a heart attack, stroke, or blocked lung artery (pulmonary embolism).    Anticoagulants also increase the risk of bleeding. Try to protect yourself from cuts and other injuries that can cause bleeding. It is important to take anticoagulants exactly as told by your health care provider.      Why do I need to be on anticoagulant therapy?  You may need this medicine if you are at risk of developing a blood clot. Conditions that increase your risk of a blood clot include:  •Being born with heart disease or a heart malformation (congenital heart disease).      •Developing heart disease.      •Having had surgery, such as valve replacement.      •Having had a serious accident or other type of severe injury (trauma).      •Having certain types of cancer.      •Having certain diseases that can increase blood clotting.      •Having a high risk of stroke or heart attack.      •Having atrial fibrillation (AF).        What are the common anticoagulant medicines?  There are several types of anticoagulant medicines. The most common types are:•Medicines that you take by mouth (oral medicines), such as:  •Warfarin.    •Novel oral anticoagulants (NOACs), such as:  •Direct thrombin inhibitors (dabigatran).      •Factor Xa inhibitors (apixaban, edoxaban, and rivaroxaban).        •Injections, such as:  •Unfractionated heparin.      •Low molecular weight heparin.        These anticoagulants work in different ways to prevent blood clots. They also have different risks and side effects.      What do I need to remember while on anticoagulant therapy?    Taking anticoagulants     •Take your medicine at the same time every day. If you forget to take your medicine, take it as soon as you remember. Do not double your dosage of medicine if you miss a whole day. Take your normal dose and call your health care provider.      • Do not stop taking your medicine unless your health care provider approves. Stopping the medicine can increase your risk of developing a blood clot.      Taking other medicines     •Take over-the-counter and prescriptions medicines only as told by your health care provider.      • Do not take over-the-counter NSAIDs, including aspirin and ibuprofen, while you are on anticoagulant therapy. These medicines increase your risk of dangerous bleeding.      •Get approval from your health care provider before you start taking any new medicines, vitamins, or herbal products. Some of these could interfere with your therapy.      General instructions     •Keep all follow-up visits as told by your health care provider. This is important.      •If you are pregnant or trying to get pregnant, talk with a health care provider about anticoagulants. Some of these medicines are not safe to take during pregnancy.      •Tell all health care providers, including your dentist, that you are on anticoagulant therapy. It is especially important to tell providers before you have any surgery, medical procedures, or dental work done.        What precautions should I take?     •Be very careful when using knives, scissors, or other sharp objects.      •Use an electric razor instead of a blade.      • Do not use toothpicks.      •Use a soft-bristled toothbrush. Brush your teeth gently.      •Always wear shoes outdoors and wear slippers indoors.      •Be careful when cutting your fingernails and toenails.      •Place bath mats in the bathroom. If possible, install handrails as well.      •Wear gloves while you do yard work.      •Wear your seat belt.      •Prevent falls by removing loose rugs and extension cords from areas where you walk. Use a cane or walker if you need it.    •Avoid constipation by:  •Drinking enough fluid to keep your urine clear or pale yellow.      •Eating foods that are high in fiber, such as fresh fruits and vegetables, whole grains, and beans.      •Limiting foods that are high in fat and processed sugars, such as fried and sweet foods.        • Do not play contact sports or participate in other activities that have a high risk for injury.        What other precautions are important if on warfarin therapy?  If you are taking a type of anticoagulant called warfarin, make sure you:  •Work with a diet and nutrition specialist (dietitian) to make an eating plan. Do not make any sudden changes to your diet after you have started your eating plan.      •Do not drink alcohol. It can interfere with your medicine and increase your risk of an injury that causes bleeding.      •Get regular blood tests as told by your health care provider.        What are some questions to ask my health care provider?    •Why do I need anticoagulant therapy?      •What is the best anticoagulant therapy for my condition?      •How long will I need anticoagulant therapy?      •What are the side effects of anticoagulant therapy?      •When should I take my medicine? What should I do if I forget to take it?      •Will I need to have regular blood tests?      •Do I need to change my diet? Are there foods or drinks that I should avoid?      •What activities are safe for me?      •What should I do if I want to get pregnant?        Contact a health care provider if:  •You miss a dose of medicine:  •And you are not sure what to do.      •For more than one day.      •You have:  •Menstrual bleeding that is heavier than normal.      •Bloody or brown urine.      •Easy bruising.      •Black and tarry stool or bright red stool.      •Side effects from your medicine.        •You feel weak or dizzy.      •You become pregnant.        Get help right away if:  •You have bleeding that will not stop within 20 minutes from:  •The nose.      •The gums.      •A cut on the skin.        •You have a severe headache or stomachache.      •You vomit or cough up blood.      •You fall or hit your head.        Summary    •Anticoagulant therapy, also called blood thinner therapy, is medicine that helps to prevent and treat blood clots.      •Anticoagulants work in different ways to prevent blood clots. They also have different risks and side effects.      •Talk with your health care provider about any precautions that you should take while on anticoagulant therapy.      This information is not intended to replace advice given to you by your health care provider. Make sure you discuss any questions you have with your health care provider.

## 2021-06-01 ENCOUNTER — APPOINTMENT (OUTPATIENT)
Dept: MRI IMAGING | Facility: CLINIC | Age: 61
End: 2021-06-01

## 2021-06-24 ENCOUNTER — APPOINTMENT (OUTPATIENT)
Dept: CARDIOLOGY | Facility: CLINIC | Age: 61
End: 2021-06-24
Payer: MEDICAID

## 2021-06-24 ENCOUNTER — NON-APPOINTMENT (OUTPATIENT)
Age: 61
End: 2021-06-24

## 2021-06-24 VITALS
SYSTOLIC BLOOD PRESSURE: 129 MMHG | BODY MASS INDEX: 42.66 KG/M2 | RESPIRATION RATE: 19 BRPM | HEART RATE: 99 BPM | DIASTOLIC BLOOD PRESSURE: 81 MMHG | TEMPERATURE: 97.7 F | HEIGHT: 72 IN | OXYGEN SATURATION: 98 % | WEIGHT: 315 LBS

## 2021-06-24 VITALS
SYSTOLIC BLOOD PRESSURE: 129 MMHG | DIASTOLIC BLOOD PRESSURE: 81 MMHG | HEART RATE: 99 BPM | HEIGHT: 72 IN | RESPIRATION RATE: 16 BRPM | OXYGEN SATURATION: 98 % | BODY MASS INDEX: 42.66 KG/M2 | WEIGHT: 315 LBS | TEMPERATURE: 97.7 F

## 2021-06-24 VITALS — HEART RATE: 76 BPM | SYSTOLIC BLOOD PRESSURE: 140 MMHG | DIASTOLIC BLOOD PRESSURE: 68 MMHG

## 2021-06-24 DIAGNOSIS — R07.9 CHEST PAIN, UNSPECIFIED: ICD-10-CM

## 2021-06-24 PROCEDURE — 93000 ELECTROCARDIOGRAM COMPLETE: CPT

## 2021-06-24 PROCEDURE — 99204 OFFICE O/P NEW MOD 45 MIN: CPT

## 2021-06-24 RX ORDER — POLYETHYLENE GLYCOL 3350, SODIUM SULFATE ANHYDROUS, SODIUM BICARBONATE, SODIUM CHLORIDE, POTASSIUM CHLORIDE 227.1; 21.5; 6.36; 5.53; .754 G/L; G/L; G/L; G/L; G/L
227.1 POWDER, FOR SOLUTION ORAL
Qty: 1 | Refills: 0 | Status: DISCONTINUED | COMMUNITY
Start: 2020-07-23 | End: 2021-06-24

## 2021-06-24 RX ORDER — SODIUM PICOSULFATE, MAGNESIUM OXIDE, AND ANHYDROUS CITRIC ACID 10; 3.5; 12 MG/160ML; G/160ML; G/160ML
10-3.5-12 MG-GM LIQUID ORAL
Qty: 1 | Refills: 0 | Status: DISCONTINUED | COMMUNITY
Start: 2020-07-15 | End: 2021-06-24

## 2021-07-21 ENCOUNTER — APPOINTMENT (OUTPATIENT)
Dept: CARDIOLOGY | Facility: CLINIC | Age: 61
End: 2021-07-21

## 2021-09-28 ENCOUNTER — APPOINTMENT (OUTPATIENT)
Dept: RHEUMATOLOGY | Facility: CLINIC | Age: 61
End: 2021-09-28

## 2021-10-04 ENCOUNTER — APPOINTMENT (OUTPATIENT)
Dept: DERMATOLOGY | Facility: CLINIC | Age: 61
End: 2021-10-04

## 2021-10-08 ENCOUNTER — APPOINTMENT (OUTPATIENT)
Dept: RHEUMATOLOGY | Facility: CLINIC | Age: 61
End: 2021-10-08

## 2021-10-11 ENCOUNTER — APPOINTMENT (OUTPATIENT)
Dept: INTERNAL MEDICINE | Facility: CLINIC | Age: 61
End: 2021-10-11
Payer: MEDICAID

## 2021-10-11 VITALS — BODY MASS INDEX: 45.16 KG/M2 | HEIGHT: 72 IN

## 2021-10-11 VITALS
SYSTOLIC BLOOD PRESSURE: 140 MMHG | HEART RATE: 101 BPM | WEIGHT: 315 LBS | RESPIRATION RATE: 20 BRPM | OXYGEN SATURATION: 99 % | TEMPERATURE: 98.1 F | DIASTOLIC BLOOD PRESSURE: 72 MMHG | BODY MASS INDEX: 45.16 KG/M2

## 2021-10-11 PROCEDURE — 99215 OFFICE O/P EST HI 40 MIN: CPT

## 2021-10-15 NOTE — HEALTH RISK ASSESSMENT
[Yes] : Yes [No falls in past year] : Patient reported no falls in the past year [0] : 2) Feeling down, depressed, or hopeless: Not at all (0) [PHQ-2 Negative - No further assessment needed] : PHQ-2 Negative - No further assessment needed [[id number]] : 0 [] : No

## 2021-10-15 NOTE — ASSESSMENT
[FreeTextEntry1] : Physical exam shows a well-developed man in no acute distress blood pressure 140/72 pulse of 101 respirations 20 height 6 feet weight 333 pounds BMI 45.16 oxygen saturation on room air 99% HEENT was unremarkable chest was clear cardiovascular exam was regular abdomen was soft and nontender extremities showed no clubbing cyanosis or edema neurologic exam was nonfocal patient follows regularly with his otolaryngologist concerning his laryngeal cancer follows with a cardiologist after recent episode of chest pain .  Patient has received both doses of the Covid vaccine he was recommended to get the influenza vaccine and the Shingrix vaccine patient has been followed by Dr. Monica Anthony who has just retired old records will be obtained recent blood test in May patient will return in the spring for complete physical

## 2021-10-15 NOTE — REVIEW OF SYSTEMS
[Hearing Loss] : hearing loss [Nocturia] : nocturia [Fever] : no fever [Chills] : no chills [Fatigue] : no fatigue [Hot Flashes] : no hot flashes [Night Sweats] : no night sweats [Recent Change In Weight] : ~T no recent weight change [Discharge] : no discharge [Pain] : no pain [Redness] : no redness [Dryness] : no dryness [Vision Problems] : no vision problems [Earache] : no earache [Itching] : no itching [Nosebleeds] : no nosebleeds [Postnasal Drip] : no postnasal drip [Nasal Discharge] : no nasal discharge [Sore Throat] : no sore throat [Hoarseness] : no hoarseness [Chest Pain] : no chest pain [Palpitations] : no palpitations [Claudication] : no  leg claudication [Lower Ext Edema] : no lower extremity edema [Orthopena] : no orthopnea [Paroxysmal Nocturnal Dyspnea] : no paroxysmal nocturnal dyspnea [Shortness Of Breath] : no shortness of breath [Cough] : no cough [Wheezing] : no wheezing [Dyspnea on Exertion] : not dyspnea on exertion [Abdominal Pain] : no abdominal pain [Nausea] : no nausea [Constipation] : no constipation [Diarrhea] : no diarrhea [Vomiting] : no vomiting [Heartburn] : no heartburn [Melena] : no melena [Dysuria] : no dysuria [Incontinence] : no incontinence [Hesitancy] : no hesitancy [Hematuria] : no hematuria [Frequency] : no frequency [Impotence] : no impotency [Poor Libido] : libido not poor [Joint Pain] : no joint pain [Joint Stiffness] : no joint stiffness [Muscle Pain] : no muscle pain [Muscle Weakness] : no muscle weakness [Back Pain] : no back pain [Itching] : no itching [Joint Swelling] : no joint swelling [Mole Changes] : no mole changes [Nail Changes] : no nail changes [Hair Changes] : no hair changes [Skin Rash] : no skin rash [Headache] : no headache [Dizziness] : no dizziness [Fainting] : no fainting [Confusion] : no confusion [Unsteady Walk] : no ataxia [Memory Loss] : no memory loss [Suicidal] : not suicidal [Insomnia] : no insomnia [Anxiety] : no anxiety [Depression] : no depression [Easy Bleeding] : no easy bleeding [Easy Bruising] : no easy bruising [Swollen Glands] : no swollen glands

## 2021-10-15 NOTE — HISTORY OF PRESENT ILLNESS
[FreeTextEntry1] : 61-year-old man comes to the office as a new patient for follow-up to review his medications and discuss his overall health recently with issues with heartburn and chest pain [de-identified] : Comes to the office as a new patient for follow-up with a history of atrial fibrillation hypertension obesity diabetes GERD hyperlipidemia and laryngeal cancer patient currently denies temperature chills sweats or myalgias he does have hearing loss denies headache sinus congestion sore throat cough wheezing pleurisy chest pain shortness of breath exertional dyspnea lightheadedness palpitations dizziness vertigo or syncope had no abdominal pain nausea vomiting diarrhea constipation bright red blood per rectum or black stools diet has been good weight has been stable he does get up at night to urinate but denies dysuria or gross hematuria he has had no leg edema and has been sleeping adequately

## 2021-10-27 ENCOUNTER — APPOINTMENT (OUTPATIENT)
Dept: CARDIOLOGY | Facility: CLINIC | Age: 61
End: 2021-10-27
Payer: MEDICAID

## 2021-10-27 PROCEDURE — 93015 CV STRESS TEST SUPVJ I&R: CPT

## 2021-10-27 PROCEDURE — 93306 TTE W/DOPPLER COMPLETE: CPT

## 2022-01-20 LAB
25(OH)D3 SERPL-MCNC: 34.4 NG/ML
ALBUMIN SERPL ELPH-MCNC: 4.7 G/DL
ALP BLD-CCNC: 92 U/L
ALT SERPL-CCNC: 39 U/L
ANION GAP SERPL CALC-SCNC: 16 MMOL/L
APPEARANCE: CLEAR
AST SERPL-CCNC: 38 U/L
BASOPHILS # BLD AUTO: 0.09 K/UL
BASOPHILS NFR BLD AUTO: 1.2 %
BILIRUB SERPL-MCNC: 0.4 MG/DL
BILIRUBIN URINE: NEGATIVE
BLOOD URINE: NEGATIVE
BUN SERPL-MCNC: 10 MG/DL
CALCIUM SERPL-MCNC: 9.5 MG/DL
CHLORIDE SERPL-SCNC: 100 MMOL/L
CHOLEST SERPL-MCNC: 192 MG/DL
CO2 SERPL-SCNC: 23 MMOL/L
COLOR: NORMAL
COVID-19 NUCLEOCAPSID  GAM ANTIBODY INTERPRETATION: NEGATIVE
COVID-19 SPIKE DOMAIN ANTIBODY INTERPRETATION: POSITIVE
CREAT SERPL-MCNC: 0.73 MG/DL
CRP SERPL HS-MCNC: 7.54 MG/L
EOSINOPHIL # BLD AUTO: 0.21 K/UL
EOSINOPHIL NFR BLD AUTO: 2.8 %
ESTIMATED AVERAGE GLUCOSE: 194 MG/DL
GLUCOSE BS SERPL-MCNC: 141 MG/DL
GLUCOSE QUALITATIVE U: NEGATIVE
GLUCOSE SERPL-MCNC: 149 MG/DL
HBA1C MFR BLD HPLC: 8.4 %
HCT VFR BLD CALC: 43.3 %
HDLC SERPL-MCNC: 54 MG/DL
HGB BLD-MCNC: 14 G/DL
IMM GRANULOCYTES NFR BLD AUTO: 0.8 %
KETONES URINE: NEGATIVE
LDLC SERPL CALC-MCNC: 67 MG/DL
LEUKOCYTE ESTERASE URINE: NEGATIVE
LYMPHOCYTES # BLD AUTO: 1.19 K/UL
LYMPHOCYTES NFR BLD AUTO: 16 %
MAN DIFF?: NORMAL
MCHC RBC-ENTMCNC: 29.7 PG
MCHC RBC-ENTMCNC: 32.3 GM/DL
MCV RBC AUTO: 91.7 FL
MONOCYTES # BLD AUTO: 0.51 K/UL
MONOCYTES NFR BLD AUTO: 6.9 %
NEUTROPHILS # BLD AUTO: 5.38 K/UL
NEUTROPHILS NFR BLD AUTO: 72.3 %
NITRITE URINE: NEGATIVE
NONHDLC SERPL-MCNC: 138 MG/DL
PH URINE: 6
PLATELET # BLD AUTO: 192 K/UL
POTASSIUM SERPL-SCNC: 5 MMOL/L
PROT SERPL-MCNC: 7.1 G/DL
PROTEIN URINE: NEGATIVE
PSA FREE FLD-MCNC: 16 %
PSA FREE SERPL-MCNC: 0.12 NG/ML
PSA SERPL-MCNC: 0.78 NG/ML
RBC # BLD: 4.72 M/UL
RBC # FLD: 12.3 %
SARS-COV-2 AB SERPL IA-ACNC: >250 U/ML
SARS-COV-2 AB SERPL QL IA: 0.07 INDEX
SODIUM SERPL-SCNC: 140 MMOL/L
SPECIFIC GRAVITY URINE: 1.02
T4 FREE SERPL-MCNC: 1.1 NG/DL
TRIGL SERPL-MCNC: 352 MG/DL
TSH SERPL-ACNC: 1.24 UIU/ML
UROBILINOGEN URINE: NORMAL
VIT B12 SERPL-MCNC: 373 PG/ML
WBC # FLD AUTO: 7.44 K/UL

## 2022-02-02 RX ORDER — PEN NEEDLE, DIABETIC 32 GX 1/4"
32G X 6 MM NEEDLE, DISPOSABLE MISCELLANEOUS
Qty: 1 | Refills: 2 | Status: ACTIVE | COMMUNITY
Start: 2018-10-30 | End: 1900-01-01

## 2022-05-11 RX ORDER — PANTOPRAZOLE 40 MG/1
40 TABLET, DELAYED RELEASE ORAL
Qty: 60 | Refills: 5 | Status: DISCONTINUED | COMMUNITY
Start: 2018-12-24 | End: 2022-05-11

## 2022-07-14 DIAGNOSIS — R52 PAIN, UNSPECIFIED: ICD-10-CM

## 2022-07-19 ENCOUNTER — APPOINTMENT (OUTPATIENT)
Dept: ORTHOPEDIC SURGERY | Facility: CLINIC | Age: 62
End: 2022-07-19

## 2022-07-19 VITALS — BODY MASS INDEX: 42.66 KG/M2 | WEIGHT: 315 LBS | HEIGHT: 72 IN

## 2022-07-19 DIAGNOSIS — M25.561 PAIN IN RIGHT KNEE: ICD-10-CM

## 2022-07-19 DIAGNOSIS — M25.562 PAIN IN RIGHT KNEE: ICD-10-CM

## 2022-07-19 PROCEDURE — 73564 X-RAY EXAM KNEE 4 OR MORE: CPT | Mod: LT,RT

## 2022-07-19 PROCEDURE — 20610 DRAIN/INJ JOINT/BURSA W/O US: CPT | Mod: 50

## 2022-07-19 PROCEDURE — 99204 OFFICE O/P NEW MOD 45 MIN: CPT | Mod: 25

## 2022-07-19 NOTE — ADDENDUM
[FreeTextEntry1] : I, Ralf Washburn, acted solely as a scribe for Dr. Pierce Ellison on this date 07/19/2022.

## 2022-07-19 NOTE — PHYSICAL EXAM
[Normal RLE] : Right Lower Extremity: No scars, rashes, lesions, ulcers, skin intact [Normal LLE] : Left Lower Extremity: No scars, rashes, lesions, ulcers, skin intact [Normal Touch] : sensation intact for touch [Normal] : Oriented to person, place, and time, insight and judgement were intact and the affect was normal [de-identified] : Right Lower Extremity\par o Knee :\par ¦ Inspection/Palpation : no tenderness to palpation, 2+ effusion, valgus deformity\par ¦ Range of Motion : 0 - 95 degrees, no crepitus\par ¦ Stability : no valgus or varus instability present on provocative testing, Lachman’s Test (-)\par ¦ Strength : flexion and extension 5/5\par o Muscle Bulk : normal muscle bulk present\par o Skin : no erythema, no ecchymosis\par o Sensation : sensation to pin intact\par o Vascular Exam : no edema, no cyanosis, dorsalis pedis artery pulse 2+, posterior tibial artery pulse 2+\par \par Left Lower Extremity\par o Knee :\par ¦ Inspection/Palpation : no tenderness to palpation, 2+ effusion, valgus deformity\par ¦ Range of Motion : 0 -100 degrees, no crepitus\par ¦ Stability : no valgus or varus instability present on provocative testing, Lachman’s Test (-)\par ¦ Strength : flexion and extension 5/5\par o Muscle Bulk : normal muscle bulk present\par o Skin : no erythema, no ecchymosis\par o Sensation : sensation to pin intact\par o Vascular Exam : no edema, no cyanosis, dorsalis pedis artery pulse 2+, posterior tibial artery pulse 2+  [de-identified] : o Right Knee : AP, lateral, sunrise, and Bunn views of the knee were obtained, there are no soft tissue abnormalities, no fractures, valgus alignment, advanced osteoarthritis with bone on bone apposition at the lateral compartment, osteophyte formation, normal bone density, no bony lesions. \par \par o Left Knee : AP, lateral, sunrise, and Bunn views of the knee were obtained, there are no soft tissue abnormalities, no fractures,valgus alignment, advanced osteoarthritis with bone on bone apposition at the lateral compartment, osteophyte formation, normal bone density, no bony lesions.

## 2022-07-19 NOTE — DISCUSSION/SUMMARY
[de-identified] : The underlying pathophysiology was reviewed in great detail with the patient as well as the various treatment options, including ice, analgesics, NSAIDs, Physical therapy, steroid injections, hyaluronic gel injection, TKR.\par \par A bilateral knee aspiration and cortisone injection was provided today in office for symptomatic relief. Patient tolerated procedure well. Given patient's history of diabetes, risks were discussed prior to the injection. Patient was advised to monitor his diet and glucose levels for the next few days. Patient understands this. \par \par Activity modifications and restrictions were discussed. I advised avoiding deep bending, squatting and high intensity activity.\par \par A home exercise sheet was given and discussed with the patient to follow. I discussed the importance of weight loss to alleviate his knee pain.\par \par I have recommended utilizing a knee sleeve to provide added support and stability. \par \par We will seek authorization for bilateral knee gel injections. Once we receive authorization, we will proceed accordingly. \par \par All questions were answered, all alternatives discussed and the patient is in complete agreement with that plan. Follow-up appointment as instructed. Any issues and the patient will call or come in sooner.

## 2022-07-19 NOTE — HISTORY OF PRESENT ILLNESS
[de-identified] : Patient is a 61 year-old male who presents to the office today for initial evaluation of bilateral knee pain. Pain is present for the past few years. The pain is worse on the left than the right. Pain has been worsening over the past few week. Patient is a . He denies any injury or inciting event. He describes a dull diffusely anterior pain that is associated with stiffness. He gets up and walks around which improves the stiffness over time, but not the pain. He takes Aleve which gives some mild relief. He has had a cortisone injection many years ago, but nothing recently. He endorses a history of meniscus surgery of the left knee in 1989. He presents today for further treatment options. No recent imaging. \par

## 2022-07-19 NOTE — PROCEDURE
[de-identified] : At this point I recommended aspiration and therapeutic injection and under sterile precautions an injection of 2 cc 1% lidocaine with 0.5 cc of Kenalog and 0.5 cc of Dexamethasone- was placed into the joint of the Right knee without complication after 15 cc of yellow synovial fluid was aspirated and after several minutes the patient felt significant relief.		\par \par At this point I recommended aspiration and therapeutic injection and under sterile precautions an injection of 2 cc 1% lidocaine with 0.5 cc of Kenalog and 0.5 cc of Dexamethasone- was placed into the joint of the Left knee without complication after 10 cc of yellow synovial fluid was aspirated and after several minutes the patient felt significant relief.

## 2022-08-18 ENCOUNTER — APPOINTMENT (OUTPATIENT)
Dept: ORTHOPEDIC SURGERY | Facility: CLINIC | Age: 62
End: 2022-08-18

## 2022-08-18 PROCEDURE — 99214 OFFICE O/P EST MOD 30 MIN: CPT

## 2022-08-18 RX ORDER — HYALURONATE SODIUM, STABILIZED 88 MG/4 ML
88 SYRINGE (ML) INTRAARTICULAR
Qty: 2 | Refills: 0 | Status: ACTIVE | COMMUNITY
Start: 2022-08-18

## 2022-11-10 ENCOUNTER — RX RENEWAL (OUTPATIENT)
Age: 62
End: 2022-11-10

## 2022-12-26 ENCOUNTER — RX RENEWAL (OUTPATIENT)
Age: 62
End: 2022-12-26

## 2022-12-30 ENCOUNTER — APPOINTMENT (OUTPATIENT)
Dept: INTERNAL MEDICINE | Facility: CLINIC | Age: 62
End: 2022-12-30
Payer: MEDICAID

## 2022-12-30 ENCOUNTER — NON-APPOINTMENT (OUTPATIENT)
Age: 62
End: 2022-12-30

## 2022-12-30 VITALS
SYSTOLIC BLOOD PRESSURE: 152 MMHG | BODY MASS INDEX: 42.66 KG/M2 | HEART RATE: 106 BPM | WEIGHT: 315 LBS | RESPIRATION RATE: 18 BRPM | OXYGEN SATURATION: 98 % | DIASTOLIC BLOOD PRESSURE: 78 MMHG | TEMPERATURE: 97.5 F | HEIGHT: 72 IN

## 2022-12-30 DIAGNOSIS — R00.2 PALPITATIONS: ICD-10-CM

## 2022-12-30 DIAGNOSIS — Z00.00 ENCOUNTER FOR GENERAL ADULT MEDICAL EXAMINATION W/OUT ABNORMAL FINDINGS: ICD-10-CM

## 2022-12-30 DIAGNOSIS — C32.9 MALIGNANT NEOPLASM OF LARYNX, UNSPECIFIED: ICD-10-CM

## 2022-12-30 DIAGNOSIS — I48.91 UNSPECIFIED ATRIAL FIBRILLATION: ICD-10-CM

## 2022-12-30 DIAGNOSIS — E66.9 OBESITY, UNSPECIFIED: ICD-10-CM

## 2022-12-30 DIAGNOSIS — K21.9 GASTRO-ESOPHAGEAL REFLUX DISEASE W/OUT ESOPHAGITIS: ICD-10-CM

## 2022-12-30 PROCEDURE — 93000 ELECTROCARDIOGRAM COMPLETE: CPT | Mod: 59

## 2022-12-30 PROCEDURE — 99396 PREV VISIT EST AGE 40-64: CPT | Mod: 25

## 2023-01-25 LAB
25(OH)D3 SERPL-MCNC: 40.5 NG/ML
ALBUMIN SERPL ELPH-MCNC: 4.6 G/DL
ALP BLD-CCNC: 89 U/L
ALT SERPL-CCNC: 61 U/L
ANION GAP SERPL CALC-SCNC: 13 MMOL/L
APPEARANCE: CLEAR
AST SERPL-CCNC: 54 U/L
BASOPHILS # BLD AUTO: 0.07 K/UL
BASOPHILS NFR BLD AUTO: 1.1 %
BILIRUB SERPL-MCNC: 0.4 MG/DL
BILIRUBIN URINE: NEGATIVE
BLOOD URINE: NEGATIVE
BUN SERPL-MCNC: 10 MG/DL
CALCIUM SERPL-MCNC: 9.8 MG/DL
CHLORIDE SERPL-SCNC: 102 MMOL/L
CHOLEST SERPL-MCNC: 179 MG/DL
CO2 SERPL-SCNC: 27 MMOL/L
COLOR: NORMAL
COVID-19 NUCLEOCAPSID  GAM ANTIBODY INTERPRETATION: NEGATIVE
COVID-19 SPIKE DOMAIN ANTIBODY INTERPRETATION: POSITIVE
CREAT SERPL-MCNC: 0.66 MG/DL
CRP SERPL HS-MCNC: 6.62 MG/L
EGFR: 106 ML/MIN/1.73M2
EOSINOPHIL # BLD AUTO: 0.23 K/UL
EOSINOPHIL NFR BLD AUTO: 3.7 %
ESTIMATED AVERAGE GLUCOSE: 192 MG/DL
GLUCOSE BS SERPL-MCNC: 162 MG/DL
GLUCOSE QUALITATIVE U: NEGATIVE
GLUCOSE SERPL-MCNC: 172 MG/DL
HBA1C MFR BLD HPLC: 8.3 %
HCT VFR BLD CALC: 43.8 %
HDLC SERPL-MCNC: 56 MG/DL
HGB BLD-MCNC: 14.1 G/DL
IMM GRANULOCYTES NFR BLD AUTO: 0.8 %
KETONES URINE: NEGATIVE
LDLC SERPL CALC-MCNC: 79 MG/DL
LEUKOCYTE ESTERASE URINE: NEGATIVE
LYMPHOCYTES # BLD AUTO: 1.26 K/UL
LYMPHOCYTES NFR BLD AUTO: 20.2 %
MAN DIFF?: NORMAL
MCHC RBC-ENTMCNC: 29.3 PG
MCHC RBC-ENTMCNC: 32.2 GM/DL
MCV RBC AUTO: 90.9 FL
MONOCYTES # BLD AUTO: 0.48 K/UL
MONOCYTES NFR BLD AUTO: 7.7 %
NEUTROPHILS # BLD AUTO: 4.15 K/UL
NEUTROPHILS NFR BLD AUTO: 66.5 %
NITRITE URINE: NEGATIVE
NONHDLC SERPL-MCNC: 123 MG/DL
PH URINE: 6
PLATELET # BLD AUTO: 180 K/UL
POTASSIUM SERPL-SCNC: 4.9 MMOL/L
PROT SERPL-MCNC: 6.8 G/DL
PROTEIN URINE: NEGATIVE
PSA FREE FLD-MCNC: 11 %
PSA FREE SERPL-MCNC: 0.15 NG/ML
PSA SERPL-MCNC: 1.44 NG/ML
RBC # BLD: 4.82 M/UL
RBC # FLD: 12.2 %
SARS-COV-2 AB SERPL IA-ACNC: 62.8 U/ML
SARS-COV-2 AB SERPL QL IA: 0.09 INDEX
SODIUM SERPL-SCNC: 141 MMOL/L
SPECIFIC GRAVITY URINE: 1.02
T4 FREE SERPL-MCNC: 1.2 NG/DL
TRIGL SERPL-MCNC: 218 MG/DL
TSH SERPL-ACNC: 1.1 UIU/ML
UROBILINOGEN URINE: NORMAL
VIT B12 SERPL-MCNC: 430 PG/ML
WBC # FLD AUTO: 6.24 K/UL

## 2023-01-27 PROBLEM — R00.2 PALPITATIONS: Status: ACTIVE | Noted: 2023-01-27

## 2023-01-27 NOTE — HISTORY OF PRESENT ILLNESS
[FreeTextEntry1] : 62-year-old man comes to the office for a comprehensive physical examination to review his medications and discuss his overall health recent issues with heartburn and knee pain [de-identified] : Comes to the office for a comprehensive physical examination with a history of hypertension atrial fibrillation diabetes mellitus gastroesophageal reflux disease laryngeal cancer obesity hyperlipidemia osteoarthritis of both knees review of systems is significant for hearing loss nocturia occasional heartburn without dysphagia knee pain remaining review of systems is noncontributory

## 2023-01-27 NOTE — REVIEW OF SYSTEMS
[Hearing Loss] : hearing loss [Nocturia] : nocturia [Heartburn] : heartburn [Joint Pain] : joint pain [Fever] : no fever [Chills] : no chills [Fatigue] : no fatigue [Hot Flashes] : no hot flashes [Night Sweats] : no night sweats [Recent Change In Weight] : ~T no recent weight change [Discharge] : no discharge [Pain] : no pain [Redness] : no redness [Dryness] : no dryness [Vision Problems] : no vision problems [Itching] : no itching [Earache] : no earache [Nosebleeds] : no nosebleeds [Postnasal Drip] : no postnasal drip [Nasal Discharge] : no nasal discharge [Sore Throat] : no sore throat [Hoarseness] : no hoarseness [Chest Pain] : no chest pain [Palpitations] : no palpitations [Claudication] : no  leg claudication [Lower Ext Edema] : no lower extremity edema [Orthopena] : no orthopnea [Paroxysmal Nocturnal Dyspnea] : no paroxysmal nocturnal dyspnea [Shortness Of Breath] : no shortness of breath [Wheezing] : no wheezing [Cough] : no cough [Dyspnea on Exertion] : not dyspnea on exertion [Abdominal Pain] : no abdominal pain [Nausea] : no nausea [Constipation] : no constipation [Diarrhea] : no diarrhea [Vomiting] : no vomiting [Melena] : no melena [Dysuria] : no dysuria [Incontinence] : no incontinence [Hesitancy] : no hesitancy [Hematuria] : no hematuria [Frequency] : no frequency [Impotence] : no impotency [Poor Libido] : libido not poor [Joint Stiffness] : no joint stiffness [Muscle Pain] : no muscle pain [Muscle Weakness] : no muscle weakness [Back Pain] : no back pain [Joint Swelling] : no joint swelling [Itching] : no itching [Mole Changes] : no mole changes [Nail Changes] : no nail changes [Hair Changes] : no hair changes [Skin Rash] : no skin rash [Headache] : no headache [Dizziness] : no dizziness [Fainting] : no fainting [Confusion] : no confusion [Unsteady Walk] : no ataxia [Memory Loss] : no memory loss [Suicidal] : not suicidal [Insomnia] : no insomnia [Anxiety] : no anxiety [Depression] : no depression [Easy Bleeding] : no easy bleeding [Easy Bruising] : no easy bruising [Swollen Glands] : no swollen glands [FreeTextEntry9] : knees

## 2023-01-27 NOTE — HEALTH RISK ASSESSMENT
[HIV test declined] : HIV test declined [Hepatitis C test declined] : Hepatitis C test declined [Yes] : Yes [Former] : Former [No falls in past year] : Patient reported no falls in the past year [0] : 2) Feeling down, depressed, or hopeless: Not at all (0) [PHQ-2 Negative - No further assessment needed] : PHQ-2 Negative - No further assessment needed [de-identified] : social [WFL4Ipsas] : 0 [ColonoscopyDate] : 07/20

## 2023-01-27 NOTE — ASSESSMENT
[FreeTextEntry1] : Physical examination shows a well-developed man in no acute distress blood pressure 152/78 height 6 feet weight 335 pounds BMI 45.43 temperature 97.5 °F respiratory rate of 16 pulse 106 HEENT was unremarkable chest was clear cardiovascular exam was regular with no extra heart sounds or murmurs abdomen was soft extremities show trace lower extremity edema neurologic exam was nonfocal Long discussion with him concerning his obesity and the medical ramifications weight watchers was suggestion a dietitian was suggested portion reduction carbohydrate restriction exercise lack of late night eating and snacking patient realizes that he is in the weight level of considering bariatric surgeon name of the bariatric surgeon was given patient had recent blood test which were significant for PSA of 1.44 vitamin D of 40.5 AST of 54 ALT of 61 fatty liver most likely cause hemoglobin A1c of 8.3% patient will forward this information to his endocrinologist for adjustments to be made EKG showed a normal sinus rhythm at 100 poor R wave progression inferior ischemia versus MI these changes are unchanged from prior EKGs blood pressure is well controlled at the present visit patient follows regularly with a otolaryngologist concerning his laryngeal cancer patient recently been seeing an orthopedics concerning his knees patient has had minimal episodes of heartburn on omeprazole

## 2023-02-04 ENCOUNTER — RX RENEWAL (OUTPATIENT)
Age: 63
End: 2023-02-04

## 2023-02-10 ENCOUNTER — RX RENEWAL (OUTPATIENT)
Age: 63
End: 2023-02-10

## 2023-05-19 ENCOUNTER — LABORATORY RESULT (OUTPATIENT)
Age: 63
End: 2023-05-19

## 2023-05-30 ENCOUNTER — RESULT REVIEW (OUTPATIENT)
Age: 63
End: 2023-05-30

## 2023-05-30 ENCOUNTER — APPOINTMENT (OUTPATIENT)
Dept: INTERNAL MEDICINE | Facility: CLINIC | Age: 63
End: 2023-05-30
Payer: MEDICAID

## 2023-05-30 VITALS
SYSTOLIC BLOOD PRESSURE: 148 MMHG | DIASTOLIC BLOOD PRESSURE: 80 MMHG | BODY MASS INDEX: 42.66 KG/M2 | WEIGHT: 315 LBS | RESPIRATION RATE: 18 BRPM | TEMPERATURE: 98.4 F | OXYGEN SATURATION: 98 % | HEIGHT: 72 IN | HEART RATE: 93 BPM

## 2023-05-30 DIAGNOSIS — R74.8 ABNORMAL LEVELS OF OTHER SERUM ENZYMES: ICD-10-CM

## 2023-05-30 PROCEDURE — 99214 OFFICE O/P EST MOD 30 MIN: CPT

## 2023-05-30 NOTE — HISTORY OF PRESENT ILLNESS
[FreeTextEntry1] : liver issues [de-identified] : recently quit drinknig and concerned about liver\par \par abd distention\par \par recent labs reviewed

## 2023-05-31 LAB
25(OH)D3 SERPL-MCNC: 46.2 NG/ML
ALBUMIN SERPL ELPH-MCNC: 4.5 G/DL
ALP BLD-CCNC: 92 U/L
ALT SERPL-CCNC: 99 U/L
ANION GAP SERPL CALC-SCNC: 15 MMOL/L
APPEARANCE: CLEAR
AST SERPL-CCNC: 58 U/L
BACTERIA: NEGATIVE /HPF
BILIRUB SERPL-MCNC: 0.3 MG/DL
BILIRUBIN URINE: NEGATIVE
BLOOD URINE: NEGATIVE
BUN SERPL-MCNC: 9 MG/DL
CALCIUM SERPL-MCNC: 9.7 MG/DL
CAST: 0 /LPF
CHLORIDE SERPL-SCNC: 101 MMOL/L
CHOLEST SERPL-MCNC: 190 MG/DL
CO2 SERPL-SCNC: 27 MMOL/L
COLOR: YELLOW
CREAT SERPL-MCNC: 0.7 MG/DL
CRP SERPL HS-MCNC: 7.48 MG/L
EGFR: 104 ML/MIN/1.73M2
EPITHELIAL CELLS: 1 /HPF
ESTIMATED AVERAGE GLUCOSE: 223 MG/DL
GLUCOSE QUALITATIVE U: 500 MG/DL
GLUCOSE SERPL-MCNC: 247 MG/DL
HBA1C MFR BLD HPLC: 9.4 %
HDLC SERPL-MCNC: 47 MG/DL
KETONES URINE: NEGATIVE MG/DL
LDLC SERPL CALC-MCNC: 103 MG/DL
LEUKOCYTE ESTERASE URINE: NEGATIVE
MICROSCOPIC-UA: NORMAL
NITRITE URINE: NEGATIVE
NONHDLC SERPL-MCNC: 143 MG/DL
PH URINE: 5.5
POTASSIUM SERPL-SCNC: 5 MMOL/L
PROT SERPL-MCNC: 7.1 G/DL
PROTEIN URINE: NEGATIVE MG/DL
PSA FREE FLD-MCNC: 13 %
PSA FREE SERPL-MCNC: 0.12 NG/ML
PSA SERPL-MCNC: 0.95 NG/ML
RED BLOOD CELLS URINE: 2 /HPF
SODIUM SERPL-SCNC: 143 MMOL/L
SPECIFIC GRAVITY URINE: 1.02
TRIGL SERPL-MCNC: 200 MG/DL
TSH SERPL-ACNC: 1.42 UIU/ML
UROBILINOGEN URINE: 0.2 MG/DL
WHITE BLOOD CELLS URINE: 0 /HPF

## 2023-06-09 ENCOUNTER — APPOINTMENT (OUTPATIENT)
Dept: ULTRASOUND IMAGING | Facility: CLINIC | Age: 63
End: 2023-06-09

## 2023-06-11 ENCOUNTER — APPOINTMENT (OUTPATIENT)
Dept: ULTRASOUND IMAGING | Facility: IMAGING CENTER | Age: 63
End: 2023-06-11
Payer: MEDICAID

## 2023-06-11 ENCOUNTER — OUTPATIENT (OUTPATIENT)
Dept: OUTPATIENT SERVICES | Facility: HOSPITAL | Age: 63
LOS: 1 days | End: 2023-06-11
Payer: MEDICAID

## 2023-06-11 DIAGNOSIS — R74.8 ABNORMAL LEVELS OF OTHER SERUM ENZYMES: ICD-10-CM

## 2023-06-11 DIAGNOSIS — Z98.890 OTHER SPECIFIED POSTPROCEDURAL STATES: Chronic | ICD-10-CM

## 2023-06-11 DIAGNOSIS — K08.409 PARTIAL LOSS OF TEETH, UNSPECIFIED CAUSE, UNSPECIFIED CLASS: Chronic | ICD-10-CM

## 2023-06-11 PROCEDURE — 76705 ECHO EXAM OF ABDOMEN: CPT | Mod: 26

## 2023-06-11 PROCEDURE — 76705 ECHO EXAM OF ABDOMEN: CPT

## 2023-08-19 RX ORDER — OMEPRAZOLE 40 MG/1
40 CAPSULE, DELAYED RELEASE ORAL
Qty: 90 | Refills: 3 | Status: ACTIVE | COMMUNITY
Start: 2022-05-11 | End: 1900-01-01

## 2023-09-05 ENCOUNTER — RX RENEWAL (OUTPATIENT)
Age: 63
End: 2023-09-05

## 2023-09-22 ENCOUNTER — APPOINTMENT (OUTPATIENT)
Dept: RHEUMATOLOGY | Facility: CLINIC | Age: 63
End: 2023-09-22

## 2023-09-26 ENCOUNTER — APPOINTMENT (OUTPATIENT)
Dept: ORTHOPEDIC SURGERY | Facility: CLINIC | Age: 63
End: 2023-09-26
Payer: MEDICAID

## 2023-09-26 VITALS — HEIGHT: 72 IN | BODY MASS INDEX: 41.99 KG/M2 | WEIGHT: 310 LBS

## 2023-09-26 PROCEDURE — 73564 X-RAY EXAM KNEE 4 OR MORE: CPT | Mod: LT,RT

## 2023-09-26 PROCEDURE — 20610 DRAIN/INJ JOINT/BURSA W/O US: CPT | Mod: 50

## 2023-09-26 PROCEDURE — 99214 OFFICE O/P EST MOD 30 MIN: CPT | Mod: 25

## 2023-12-06 ENCOUNTER — APPOINTMENT (OUTPATIENT)
Dept: FAMILY MEDICINE | Facility: CLINIC | Age: 63
End: 2023-12-06
Payer: MEDICAID

## 2023-12-06 VITALS
RESPIRATION RATE: 14 BRPM | TEMPERATURE: 99.3 F | OXYGEN SATURATION: 96 % | HEART RATE: 90 BPM | DIASTOLIC BLOOD PRESSURE: 75 MMHG | HEIGHT: 72 IN | SYSTOLIC BLOOD PRESSURE: 147 MMHG | BODY MASS INDEX: 41.31 KG/M2 | WEIGHT: 305 LBS

## 2023-12-06 DIAGNOSIS — K64.4 RESIDUAL HEMORRHOIDAL SKIN TAGS: ICD-10-CM

## 2023-12-06 PROCEDURE — 99214 OFFICE O/P EST MOD 30 MIN: CPT

## 2023-12-06 RX ORDER — HYDROCORTISONE 10 MG/G
1 CREAM TOPICAL
Qty: 1 | Refills: 0 | Status: ACTIVE | COMMUNITY
Start: 2023-12-06 | End: 1900-01-01

## 2023-12-14 ENCOUNTER — RX RENEWAL (OUTPATIENT)
Age: 63
End: 2023-12-14

## 2023-12-20 ENCOUNTER — EMERGENCY (EMERGENCY)
Facility: HOSPITAL | Age: 63
LOS: 1 days | Discharge: HOME | End: 2023-12-20
Attending: STUDENT IN AN ORGANIZED HEALTH CARE EDUCATION/TRAINING PROGRAM | Admitting: STUDENT IN AN ORGANIZED HEALTH CARE EDUCATION/TRAINING PROGRAM
Payer: MEDICAID

## 2023-12-20 VITALS
TEMPERATURE: 98 F | OXYGEN SATURATION: 100 % | WEIGHT: 304.9 LBS | DIASTOLIC BLOOD PRESSURE: 83 MMHG | HEIGHT: 72 IN | RESPIRATION RATE: 18 BRPM | SYSTOLIC BLOOD PRESSURE: 154 MMHG | HEART RATE: 62 BPM

## 2023-12-20 DIAGNOSIS — Z98.890 OTHER SPECIFIED POSTPROCEDURAL STATES: Chronic | ICD-10-CM

## 2023-12-20 DIAGNOSIS — K08.409 PARTIAL LOSS OF TEETH, UNSPECIFIED CAUSE, UNSPECIFIED CLASS: Chronic | ICD-10-CM

## 2023-12-20 PROCEDURE — 72131 CT LUMBAR SPINE W/O DYE: CPT | Mod: MA

## 2023-12-20 PROCEDURE — 99284 EMERGENCY DEPT VISIT MOD MDM: CPT

## 2023-12-20 PROCEDURE — 96372 THER/PROPH/DIAG INJ SC/IM: CPT

## 2023-12-20 PROCEDURE — 72131 CT LUMBAR SPINE W/O DYE: CPT | Mod: 26,MA

## 2023-12-20 RX ORDER — LIDOCAINE 4 G/100G
1 CREAM TOPICAL ONCE
Refills: 0 | Status: COMPLETED | OUTPATIENT
Start: 2023-12-20 | End: 2023-12-20

## 2023-12-20 RX ORDER — ACETAMINOPHEN 500 MG
650 TABLET ORAL ONCE
Refills: 0 | Status: COMPLETED | OUTPATIENT
Start: 2023-12-20 | End: 2023-12-20

## 2023-12-20 RX ORDER — TIZANIDINE 4 MG/1
2 TABLET ORAL
Qty: 30 | Refills: 0
Start: 2023-12-20 | End: 2023-12-24

## 2023-12-20 RX ORDER — KETOROLAC TROMETHAMINE 30 MG/ML
30 SYRINGE (ML) INJECTION ONCE
Refills: 0 | Status: DISCONTINUED | OUTPATIENT
Start: 2023-12-20 | End: 2023-12-20

## 2023-12-20 RX ADMIN — Medication 30 MILLIGRAM(S): at 11:11

## 2023-12-20 RX ADMIN — Medication 30 MILLIGRAM(S): at 11:52

## 2023-12-20 RX ADMIN — Medication 650 MILLIGRAM(S): at 11:12

## 2023-12-20 RX ADMIN — Medication 650 MILLIGRAM(S): at 11:52

## 2023-12-20 RX ADMIN — LIDOCAINE 1 PATCH: 4 CREAM TOPICAL at 11:11

## 2023-12-20 NOTE — ED ADULT NURSE NOTE - OBJECTIVE STATEMENT
Pt came in from home with c/o lower back pain s/p trip and fall down 3 steps at home 2 days ago. Pt is now experiencing numbness down RLE and lower back pain. Pt has increased pain when sitting down. No bowel/bladder dysfunction. Denies head strike, no LOC or use of thinners.

## 2023-12-20 NOTE — ED ADULT TRIAGE NOTE - CHIEF COMPLAINT QUOTE
Pt c/o lower back pain s/p trip and fall down 3 steps at home 2 days ago. Pt is now experiencing numbness down RLE. Pt has increased pain when sitting down. No bowel/bladder dysfunction. Denies head strike, no LOC or use of thinners.

## 2023-12-20 NOTE — ED ADULT TRIAGE NOTE - AS HEIGHT TYPE
[facility] [address], [address]  Consent for Procedure/Sedation    Date: 12/8/2017    Time: 1309      1.  I authorize the performance upon Chula Ocasio the following:cardiac catheterization, left ventricular cineangiograp period, the physician will determine when the applicable recovery period ends for purposes of reinstating the Do Not Resuscitate (DNR) order.     Signature of Patient: ____________________________________________________    Signature of person authorized stated

## 2023-12-20 NOTE — ED PROVIDER NOTE - PHYSICAL EXAMINATION
VITAL SIGNS: I have reviewed nursing notes and confirm.  CONSTITUTIONAL: well-appearing, non-toxic, NAD  SKIN: Warm dry, normal skin turgor  HEAD: NCAT  ABD: soft, + BS, non-tender, non-distended,   EXT: Full ROM, no bony tenderness, no pedal edema, no calf tenderness  NEURO: normal motor. normal sensory.

## 2023-12-20 NOTE — ED ADULT NURSE NOTE - NS ED NURSE LEVEL OF CONSCIOUSNESS ORIENTATION
S:Pt s/p lap cholecystectomy with pain 7-8/10 on 0-10 pain scale.  B:Pt here for outpt Lap cholecystectomy with Dr. Bishop  A:Pt continues to have pain @ 7-8 on 0-10 pain scale after all oral pain medications given. Pt requesting more pain medication at this time, but not due for next oral dose until 2040. Will notify Dr. Bishop of pt status at this time.   R:Dr. Bishop notified,  He will plan to write orders for pt to be admitted for IV pain control. Med-Surge Charge RN notified.    Oriented - self; Oriented - place; Oriented - time

## 2023-12-20 NOTE — ED ADULT NURSE NOTE - TEMPLATE
Quality 431: Preventive Care And Screening: Unhealthy Alcohol Use - Screening: Patient not identified as an unhealthy alcohol user when screened for unhealthy alcohol use using a systematic screening method Quality 130: Documentation Of Current Medications In The Medical Record: Current Medications Documented Detail Level: Detailed Quality 226: Preventive Care And Screening: Tobacco Use: Screening And Cessation Intervention: Patient screened for tobacco use and is an ex/non-smoker Quality 110: Preventive Care And Screening: Influenza Immunization: Influenza Immunization Administered during Influenza season Fall

## 2023-12-20 NOTE — ED PROVIDER NOTE - PATIENT PORTAL LINK FT
You can access the FollowMyHealth Patient Portal offered by [facility] by registering at the following website: [email or web address]. By joining [facility]’s FollowMyHealth portal, you will also be able to view your health information using other applications (apps) compatible with our system. You can access the FollowMyHealth Patient Portal offered by [facility] by registering at the following website: [email or web address]. By joining [facility]’s FollowMyHealth portal, you will also be able to view your health information using other applications (apps) compatible with our system.

## 2023-12-20 NOTE — ED PROVIDER NOTE - CLINICAL SUMMARY MEDICAL DECISION MAKING FREE TEXT BOX
63M no significant PMHx presenting to ED s/p fall mechanical trip and fall 3 days ago on his back, no head injury or a/c w/ complaints of mid-back pain w/ radicular pain down RLE, has not taken anything for pain, works as . patient denies any trouble ambulating, no saddle anesthesia, no bowel or bladder incontinence.     ct lumbar w/o fracture   pain control  orthopedic f/u   return precautions

## 2023-12-20 NOTE — ED PROVIDER NOTE - OBJECTIVE STATEMENT
63M no significant PMHx presenting to ED s/p fall mechanical trip and fall 3 days ago on his back, no head injury or a/c w/ complaints of mid-back pain w/ radicular pain down RLE, has not taken anything for pain, works as . patient denies any trouble ambulating, no saddle anesthesia, no bowel or bladder incontinence.

## 2023-12-20 NOTE — ED PROVIDER NOTE - NSICDXPASTMEDICALHX_GEN_ALL_CORE_FT
2023       Caden Rondon MD  [address] [facility] [address]  Via In Basket      Patient: Stefania Chacon   YOB: 1963   Date of Visit: 2023       Dear Dr. Rondon:    I saw your patient, Stefania Chacon, for an evaluation. Below are my notes for this visit with her.    If you have questions, please do not hesitate to call me.      Sincerely,        Sharda Anton CNP        CC: No Recipients  Sharda Melara CNP  2023  8:13 AM  Signed  RHEUMATOLOGY FOLLOW UP VISIT    Name: Stefania Chacon  : 1963     Referred by: Caden Rondon MD    Chief Complaint   Patient presents with   • Rheumatoid Arthritis     2 month follow-up visit.     The  Cures Act makes medical notes like these available to patients in the interest of transparency. Please be advised that this is a medical document. Medical documents are intended to carry relevant information, facts as evident, and the clinical opinion of the practitioner. The medical note is intended as peer to peer communication, and may appear blunt or direct. It is written in medical language, and may contain abbreviations or verbiage that are unfamiliar.          HISTORY OF PRESENT ILLNESS  This is a very pleasant  59 year old female here today for follow up for seropositive rheumatoid arthritis.  She has additional history of fibromyalgia , chronic neck and back pain , neuropathy, PTSD and bipolar disease      Past labs done in   reveal a + RF of 63 Units and anti CCP of 253 Units/mL consistent with seropositive rheumatoid arthritis ,She has been seen by Dr Erik Thomason since 2017 .She has remote labs done in  with a + RF of 115 Units/mL , C-RP of 1.2 mg/dl , normal ESR done by her PCP consistent with diagnosis of seropositive RA and was seen by Dr Thomason in  at that time she was asymptomatic however her disease has since progressed         She had recent labs with an ESR of 4 mm/hr , C-RP was normal , CMP  reveals good kidney and liver function tests , and CBC reveals normal WBC and platelet count and no anemia on lab     Last eye exam: Due for eye exam     She was started on methotrexate 10 mg weekly and folic acid 1 mg daily on last visit , she reached out to office with report of weight gain and rash to knees and elbows and vision being off.  I had responded back asking her to take a picture of rash and let her know methotrexate would not cause weight gain nor vision issues and instructed her to follow up with ophthalmology regarding her vision . She is on hydroxychloroquine 200 mg BID     She has pain in her arms and hands , she is requesting a kenalog shot today, reports it not only helps her joint pain but it helps her sleep better     REVIEW OF SYSTEMS  Constitutional: no fevers or chills + weight loss of 8 pounds since last visit   EYES: + dry eyes, no blurred vision, no diplopia, no history of iritis  ENT:  + dry mouth,no nasal or oral ulcers, no dysphagia, no chronic sinus disease   Cardiovascular: no chest pain orthopnea no PND no palpitations  Respiratory: no cough no shortness of breath + occasional wheezing no stridor no dyspnea on exertion, current smoker , down to 10 cigarettes a day   GI: no nausea, + episode of vomiting on way here due to anxiety  , hx IBS-D diarrhea, no constipation no heartburn  :no dysuria urgency no hematuria  MIREYA: as above no raynauds  Heme:no history of deep venous thrombosis or pulmonary embolism no anemia, no swollen glands  CNS: + tingling or numbness to fingers and toes ,no weakness, no ataxia  PSY :+ anxiety , hx bipolar  depression, + grief , lost best friend   INTEGUMENTARY: no hair loss, no rashes  ENDO: No polyuria, no polydipsia    Past Medical History:   Diagnosis Date   • Bipolar disorder (CMD)    • H/O tubal ligation    • H/O: hysterectomy    • Hx laparoscopic cholecystectomy    • Hyperlipemia    • Hypothyroid    • Rheumatoid arthritis (CMD)         Past Surgical  History:   Procedure Laterality Date   • Anes laparoscopic cholecystectomy     • Appendectomy     • Colonoscopy diagnostic  07/28/2022    3 yr fu colonoscopy due July 28, 2025   • Hysterectomy     • Tubal ligation         Social History     Tobacco Use   • Smoking status: Every Day     Current packs/day: 0.50     Types: Cigarettes   • Smokeless tobacco: Never   Vaping Use   • Vaping Use: never used   Substance Use Topics   • Alcohol use: Yes     Comment: socially   • Drug use: Never       Current Outpatient Medications   Medication Sig Dispense Refill   • HYDROcodone-acetaminophen (NORCO)  MG per tablet Take 1 tablet by mouth every 8 hours as needed for Pain. 90 tablet 0   • buPROPion XL (WELLBUTRIN XL) 150 MG 24 hr tablet Take 1 tablet by mouth daily. 90 tablet 0   • ALPRAZolam (XANAX) 1 MG tablet Take 1 tablet by mouth 3 times daily as needed for Anxiety. 90 tablet 0   • levocetirizine (XYZAL) 5 MG tablet Take 1 tablet by mouth every evening. 30 tablet 2   • Icosapent Ethyl (Vascepa) 1 g Cap Take 1 g by mouth in the morning and 1 g in the evening. 60 capsule 5   • methotrexate 2.5 MG Tab Take 4 tablets by mouth every 7 days. 52 tablet 0   • folic acid (FOLATE) 1 MG tablet Take 1 tablet by mouth daily. 90 tablet 3   • hydroxychloroquine (PLAQUENIL) 200 MG tablet Take 1 tablet by mouth in the morning and 1 tablet in the evening. 180 tablet 0   • ergocalciferol (DRISDOL) 1.25 mg (50,000 units) capsule Take 1 capsule by mouth 1 day a week. 13 capsule 1   • atorvastatin (LIPITOR) 20 MG tablet Take 1 tablet by mouth daily. 90 tablet 1   • fenofibrate 160 MG tablet Take 1 tablet by mouth daily. 90 tablet 1   • cyclobenzaprine (FLEXERIL) 10 MG tablet Take 1 tablet by mouth at bedtime as needed for Muscle spasms. 30 tablet 0   • buPROPion XL (Wellbutrin XL) 300 MG 24 hr tablet Take 1 tablet by mouth daily. 90 tablet 1   • levothyroxine 125 MCG tablet Take 1 tablet by mouth daily. 90 tablet 1   • cholestyramine  (QUESTRAN) 4 g packet Take 1 packet by mouth in the morning and 1 packet in the evening. Take with meals. Indications: Diarrhea. 60 packet 3     Current Facility-Administered Medications   Medication Dose Route Frequency Provider Last Rate Last Admin   • triamcinolone acetonide (KENALOG-40) 40 MG/ML injection 60 mg  60 mg Intramuscular Once Sharda Melara CNP               PHYSICAL EXAM    Vitals:    06/16/23 0743 06/16/23 0749   BP: (!) 145/88 (!) 138/96   BP Location: LUE - Left upper extremity RUE - Right upper extremity   Patient Position: Sitting Sitting   Cuff Size: Regular Large Adult   Pulse: 74    Resp: 18    Temp: 98 °F (36.7 °C)    TempSrc: Oral    SpO2: 96%    Weight: 81.6 kg (179 lb 14.3 oz)    Height: 5' 3\" (1.6 m)    PainSc:  0      Body mass index is 31.87 kg/m².     Patient advised to check blood pressure on home cuff , she reports she has been more anxious than usual with loss of friend recently     Skin: no ulcers, no malar rash, no petechia no purpura.    Head and Face: Atraumatic no deformities normocephalic normal facies  Eyes: Pink conjunctiva, anicteric sclera, no periorbital swelling, no ptosis, pupils reactive to light, extraocular muscles intact.  + dry eyes.    ENT: No  sinus tenderness, no malar rash, no temporal artery tenderness,   No TMJ tenderness     Neck: Fairly good range of motion of C-spine, no paracervical tenderness, no goiter, no adenopathy, no supraclavicular masses.    Cardiac exam: S1-S2 regular, no murmurs.    Lungs: clear, no rales or wheezing, no abnormal breath sounds, good breath sounds bilaterally.    Abdomen: no hepatomegaly or splenomegaly or tenderness, no masses, no ascites.    Back: no SI joint tenderness, no paralumbar tenderness,   Musculoskeletal exam:  Good range of motion bilateral shoulder joint with no tenderness  Bilateral elbows no synovitis or tenderness, + crepitus to right elbow   Bilateral wrist joints no synovitis or  tenderness  Bilateral MCP joints no synovitis + tenderness to 2nd, 3rd and 4th MCP joints of right hand   Bilateral PIP joints no synovitis and tenderness  Bilateral DIP joints no synovitis or tenderness  Both knees no effusion warmth or tenderness no knee instability  Both ankles no synovitis or tenderness  Bilateral MTP joints no synovitis or tenderness no dactylitis  Good range of motion bilateral hip joints with no tenderness    Neurological exam: Normal gait, normal motor strength in upper and lower extremity, normal muscle tone, no tremors alert oriented x3.  good bilateral hand , no muscle atrophy.  Tender joints =3  Swollen joints =0  ESR= 4 mm/hr  CASTANON-28 score = 1.93 indicates remission     LABS  Lab Services on 06/09/2023   Component Date Value Ref Range Status   • Fasting Status 06/09/2023 0  0 - 999 Hours Final   • Sodium 06/09/2023 142  135 - 145 mmol/L Final   • Potassium 06/09/2023 4.2  3.4 - 5.1 mmol/L Final   • Chloride 06/09/2023 108  97 - 110 mmol/L Final   • Carbon Dioxide 06/09/2023 26  21 - 32 mmol/L Final   • Anion Gap 06/09/2023 12  7 - 19 mmol/L Final   • Glucose 06/09/2023 95  70 - 99 mg/dL Final   • BUN 06/09/2023 11  6 - 20 mg/dL Final   • Creatinine 06/09/2023 0.90  0.51 - 0.95 mg/dL Final   • Glomerular Filtration Rate 06/09/2023 74  >=60 Final    eGFR results = or >60 mL/min/1.73m2 = Normal kidney function. Estimated GFR calculated using the CKD-EPI-R (2021) equation that does not include race in the creatinine calculation.   • BUN/Cr 06/09/2023 12  7 - 25 Final   • Calcium 06/09/2023 9.6  8.4 - 10.2 mg/dL Final   • Bilirubin, Total 06/09/2023 0.7  0.2 - 1.0 mg/dL Final   • GOT/AST 06/09/2023 23  <=37 Units/L Final   • GPT/ALT 06/09/2023 34  <64 Units/L Final   • Alkaline Phosphatase 06/09/2023 50  45 - 117 Units/L Final   • Albumin 06/09/2023 4.2  3.6 - 5.1 g/dL Final   • Protein, Total 06/09/2023 7.5  6.4 - 8.2 g/dL Final   • Globulin 06/09/2023 3.3  2.0 - 4.0 g/dL Final   • A/G  Ratio 06/09/2023 1.3  1.0 - 2.4 Final   • RBC Sedimentation Rate 06/09/2023 4  0 - 20 mm/hr Final   • C-Reactive Protein 06/09/2023 <0.3  <=1.0 mg/dL Final   • WBC 06/09/2023 7.5  4.2 - 11.0 K/mcL Final   • RBC 06/09/2023 4.89  4.00 - 5.20 mil/mcL Final   • HGB 06/09/2023 15.4  12.0 - 15.5 g/dL Final   • HCT 06/09/2023 47.1 (H)  36.0 - 46.5 % Final   • MCV 06/09/2023 96.3  78.0 - 100.0 fl Final   • MCH 06/09/2023 31.5  26.0 - 34.0 pg Final   • MCHC 06/09/2023 32.7  32.0 - 36.5 g/dL Final   • RDW-CV 06/09/2023 13.3  11.0 - 15.0 % Final   • RDW-SD 06/09/2023 47.4  39.0 - 50.0 fL Final   • PLT 06/09/2023 367  140 - 450 K/mcL Final   • NRBC 06/09/2023 0  <=0 /100 WBC Final   • Neutrophil, Percent 06/09/2023 54  % Final   • Lymphocytes, Percent 06/09/2023 35  % Final   • Mono, Percent 06/09/2023 8  % Final   • Eosinophils, Percent 06/09/2023 3  % Final   • Basophils, Percent 06/09/2023 0  % Final   • Immature Granulocytes 06/09/2023 0  % Final   • Absolute Neutrophils 06/09/2023 4.1  1.8 - 7.7 K/mcL Final   • Absolute Lymphocytes 06/09/2023 2.6  1.0 - 4.0 K/mcL Final   • Absolute Monocytes 06/09/2023 0.6  0.3 - 0.9 K/mcL Final   • Absolute Eosinophils  06/09/2023 0.2  0.0 - 0.5 K/mcL Final   • Absolute Basophils 06/09/2023 0.0  0.0 - 0.3 K/mcL Final   • Absolute Immature Granulocytes 06/09/2023 0.0  0.0 - 0.2 K/mcL Final             ASSESSMENT  Impression: Very pleasant 59 year old female here today for follow up for seropositive rheumatoid arthritis doing moderately well on today's visit. She is so far tolerating her methotrexate , the rash she had reported to her knees is cleared up .  She is requesting a kenalog injection on today's visit, reports the last one helped her joint pain and she slept much better than she had in months , she tolerated it well.  I will keep her methotrexate at current dose and have her follow up in three months for monitoring   Problem List Items Addressed This Visit        Multi-system  (Lupus,Sarcoid...)    Rheumatoid arthritis (CMD) - Primary    Relevant Medications    triamcinolone acetonide (KENALOG-40) 40 MG/ML injection 60 mg (Start on 6/16/2023  8:15 AM)    Other Relevant Orders    CBC with Automated Differential    Comprehensive Metabolic Panel    Sedimentation Rate   Other Visit Diagnoses     High risk medication use        Relevant Orders    CBC with Automated Differential    Comprehensive Metabolic Panel    Sedimentation Rate         PLAN  Orders Placed This Encounter   • CBC with Automated Differential   • Comprehensive Metabolic Panel   • Sedimentation Rate   • triamcinolone acetonide (KENALOG-40) 40 MG/ML injection 60 mg     Labs placed for CBC, CMP, and ESR     Kenalog 60 mg IM     Continue methotrexate 10 mg weekly     Continue folic acid 1 mg daily     Continue hydroxychloroquine 200 mg BID , advised to get her ophthalmology eye exam, she had recent eye exam at optometrist, explained to her that she needs to be seen by ophthalmologist and examined for plaquenil induced maculopathy     Risks of medical conditions and side effects of medication were discussed.  Medical compliance with plan discussed and risks of non-compliance reviewed.    Patient education completed on disease process, etiology & prognosis.    Patient expresses understanding of the plan.    Return to clinic in three months   as clinically indicated as discussed with patient who verbalized understanding of & agreement with the plan.       Sharda Anton, CNP                 PAST MEDICAL HISTORY:  Diabetes mellitus     Hypertension     Morbid obesity

## 2023-12-20 NOTE — ED ADULT NURSE NOTE - NSFALLRISKINTERV_ED_ALL_ED
Communicate fall risk and risk factors to all staff, patient, and family/Provide visual cue: yellow wristband, yellow gown, etc/Reinforce activity limits and safety measures with patient and family/Call bell, personal items and telephone in reach/Instruct patient to call for assistance before getting out of bed/chair/stretcher/Non-slip footwear applied when patient is off stretcher/[place] to call system/Physically safe environment - no spills, clutter or unnecessary equipment/Purposeful Proactive Rounding/Room/bathroom lighting operational, light cord in reach Communicate fall risk and risk factors to all staff, patient, and family/Provide visual cue: yellow wristband, yellow gown, etc/Reinforce activity limits and safety measures with patient and family/Call bell, personal items and telephone in reach/Instruct patient to call for assistance before getting out of bed/chair/stretcher/Non-slip footwear applied when patient is off stretcher/[place] to call system/Physically safe environment - no spills, clutter or unnecessary equipment/Purposeful Proactive Rounding/Room/bathroom lighting operational, light cord in reach

## 2023-12-21 ENCOUNTER — NON-APPOINTMENT (OUTPATIENT)
Age: 63
End: 2023-12-21

## 2023-12-21 ENCOUNTER — APPOINTMENT (OUTPATIENT)
Dept: ORTHOPEDIC SURGERY | Facility: CLINIC | Age: 63
End: 2023-12-21
Payer: MEDICAID

## 2023-12-21 VITALS
BODY MASS INDEX: 41.31 KG/M2 | HEIGHT: 72 IN | HEART RATE: 105 BPM | WEIGHT: 305 LBS | DIASTOLIC BLOOD PRESSURE: 77 MMHG | SYSTOLIC BLOOD PRESSURE: 134 MMHG

## 2023-12-21 PROCEDURE — 99204 OFFICE O/P NEW MOD 45 MIN: CPT

## 2023-12-21 RX ORDER — DICLOFENAC POTASSIUM 50 MG/1
50 TABLET, COATED ORAL 3 TIMES DAILY
Qty: 60 | Refills: 2 | Status: ACTIVE | COMMUNITY
Start: 2023-12-21 | End: 1900-01-01

## 2023-12-21 NOTE — DISCUSSION/SUMMARY
[de-identified] : 63yM pw L4-L5 spondylolisthesis, coccyxdynia.  The patient was extensively counseled on treatment options including but not limited to observation, rest/activity modification, bracing, anti-inflammatory medications, physical therapy, injections, and surgery.  The natural history of the disease was thoroughly explained.  We discussed that the majority of the time, this condition can be initially treated conservatively. The patient will proceed with: -donut pillow -PT -diclofenac rx provided - pt instructed to take with meals and not with other nsaid's including advil, aleve, and toradol.  The pt understands to stop taking the medication if any stomach sx develop or they develop any type of bleeding or bruising, at which point they will present to their PMD -pt was instructed on the importance of resting, icing and elevating to minimize swelling -RTC 6w - consider MRI  I have personally obtained the history, reviewed the ROS as noted, and performed the physical examination today.  The patient and I discussed the assessment and options and developed the plan.  All questions were answered and the patient stated their understanding of the treatment plan and appreciation of the visit.  My cumulative time spent on this patient's visit included: Preparation for the visit, review of the medical records, review of pertinent diagnostic studies, examination and counseling of the patient on the above diagnosis, treatment plan and prognosis, orders of diagnostic tests, medications and/or appropriate procedures and documentation in the medical records of today's visit.   Edison Silveira MD

## 2023-12-21 NOTE — HISTORY OF PRESENT ILLNESS
[de-identified] : Hunter is a 63 year old male presenting today with back pain after a fall on Monday. States he fell and landed on his buttocks. He went to [facility] and a CT scan was performed and negative for acute fracture. He c/o radicular pain into the right leg but no numbness. The pain is worse with sitting down, at worst 7/10. Denies any previous injury.  Works as , sitting painful.  No BBI SA.

## 2023-12-21 NOTE — PHYSICAL EXAM
[de-identified] : Gen: NAD Resp: Nonlabored respirations Gait: Nl Head: CN I - XII grossly intact  Spine: Skin in tact, tender paraspinal region Full neck ROM UE: 5/5 D B T WE WF IO b/l SILT C4-T1 b/l (-)Bennie Andrew No hand atrophy 2+ rad bcr  LE: 5/5 IP Q HS EHL TA GS SILT L3-S1 b/l (-) clonus, (-) babinski (-) slr, c/l slr 2+ dp pt wwp bcr  [de-identified] : I independently reviewed and interpreted the xrays and CT of the lumbar spine - no fracture, no dislocation or OA; L4-L5 grade I spondy.  No other acute osseous abnormalities.

## 2023-12-27 LAB
25(OH)D3 SERPL-MCNC: 42.6 NG/ML
ALBUMIN SERPL ELPH-MCNC: 4.7 G/DL
ALP BLD-CCNC: 86 U/L
ALT SERPL-CCNC: 28 U/L
ANION GAP SERPL CALC-SCNC: 14 MMOL/L
APPEARANCE: CLEAR
AST SERPL-CCNC: 25 U/L
BASOPHILS # BLD AUTO: 0.07 K/UL
BASOPHILS NFR BLD AUTO: 1.2 %
BILIRUB SERPL-MCNC: 0.4 MG/DL
BILIRUBIN URINE: NEGATIVE
BLOOD URINE: NEGATIVE
BUN SERPL-MCNC: 10 MG/DL
CALCIUM SERPL-MCNC: 9.4 MG/DL
CHLORIDE SERPL-SCNC: 103 MMOL/L
CHOLEST SERPL-MCNC: 267 MG/DL
CO2 SERPL-SCNC: 27 MMOL/L
COLOR: YELLOW
CREAT SERPL-MCNC: 0.72 MG/DL
EGFR: 103 ML/MIN/1.73M2
EOSINOPHIL # BLD AUTO: 0.12 K/UL
EOSINOPHIL NFR BLD AUTO: 2.1 %
ESTIMATED AVERAGE GLUCOSE: 134 MG/DL
GLUCOSE QUALITATIVE U: NEGATIVE MG/DL
GLUCOSE SERPL-MCNC: 129 MG/DL
HBA1C MFR BLD HPLC: 6.3 %
HCT VFR BLD CALC: 48.3 %
HDLC SERPL-MCNC: 56 MG/DL
HGB BLD-MCNC: 15.3 G/DL
IMM GRANULOCYTES NFR BLD AUTO: 0.9 %
KETONES URINE: NEGATIVE MG/DL
LDLC SERPL CALC-MCNC: 155 MG/DL
LEUKOCYTE ESTERASE URINE: NEGATIVE
LYMPHOCYTES # BLD AUTO: 1.68 K/UL
LYMPHOCYTES NFR BLD AUTO: 29 %
MAN DIFF?: NORMAL
MCHC RBC-ENTMCNC: 29.4 PG
MCHC RBC-ENTMCNC: 31.7 GM/DL
MCV RBC AUTO: 92.9 FL
MONOCYTES # BLD AUTO: 0.42 K/UL
MONOCYTES NFR BLD AUTO: 7.3 %
NEUTROPHILS # BLD AUTO: 3.45 K/UL
NEUTROPHILS NFR BLD AUTO: 59.5 %
NITRITE URINE: NEGATIVE
NONHDLC SERPL-MCNC: 211 MG/DL
PH URINE: 6
PLATELET # BLD AUTO: 197 K/UL
POTASSIUM SERPL-SCNC: 4.8 MMOL/L
PROT SERPL-MCNC: 7.1 G/DL
PROTEIN URINE: NEGATIVE MG/DL
PSA FREE FLD-MCNC: 15 %
PSA FREE SERPL-MCNC: 0.16 NG/ML
PSA SERPL-MCNC: 1.06 NG/ML
RBC # BLD: 5.2 M/UL
RBC # FLD: 13.9 %
SODIUM SERPL-SCNC: 144 MMOL/L
SPECIFIC GRAVITY URINE: 1.02
TRIGL SERPL-MCNC: 303 MG/DL
TSH SERPL-ACNC: 1.15 UIU/ML
UROBILINOGEN URINE: 1 MG/DL
WBC # FLD AUTO: 5.79 K/UL

## 2024-01-02 ENCOUNTER — NON-APPOINTMENT (OUTPATIENT)
Age: 64
End: 2024-01-02

## 2024-01-02 ENCOUNTER — APPOINTMENT (OUTPATIENT)
Dept: INTERNAL MEDICINE | Facility: CLINIC | Age: 64
End: 2024-01-02
Payer: MEDICAID

## 2024-01-02 VITALS
HEART RATE: 101 BPM | SYSTOLIC BLOOD PRESSURE: 150 MMHG | BODY MASS INDEX: 40.36 KG/M2 | WEIGHT: 298 LBS | TEMPERATURE: 98.4 F | OXYGEN SATURATION: 94 % | DIASTOLIC BLOOD PRESSURE: 80 MMHG | HEIGHT: 72 IN | RESPIRATION RATE: 18 BRPM

## 2024-01-02 DIAGNOSIS — E11.9 TYPE 2 DIABETES MELLITUS W/OUT COMPLICATIONS: ICD-10-CM

## 2024-01-02 DIAGNOSIS — I10 ESSENTIAL (PRIMARY) HYPERTENSION: ICD-10-CM

## 2024-01-02 DIAGNOSIS — E78.5 HYPERLIPIDEMIA, UNSPECIFIED: ICD-10-CM

## 2024-01-02 PROCEDURE — 99396 PREV VISIT EST AGE 40-64: CPT | Mod: 25

## 2024-01-02 PROCEDURE — G0444 DEPRESSION SCREEN ANNUAL: CPT | Mod: 59

## 2024-01-02 RX ORDER — TIRZEPATIDE 7.5 MG/.5ML
7.5 INJECTION, SOLUTION SUBCUTANEOUS
Qty: 1 | Refills: 1 | Status: DISCONTINUED | COMMUNITY
Start: 2023-05-30 | End: 2024-01-02

## 2024-01-02 RX ORDER — ROSUVASTATIN CALCIUM 10 MG/1
10 TABLET, FILM COATED ORAL
Qty: 90 | Refills: 3 | Status: ACTIVE | COMMUNITY
Start: 2018-10-10 | End: 1900-01-01

## 2024-01-02 RX ORDER — LIRAGLUTIDE 6 MG/ML
18 INJECTION SUBCUTANEOUS
Qty: 27 | Refills: 1 | Status: DISCONTINUED | COMMUNITY
Start: 2018-11-25 | End: 2024-01-02

## 2024-01-02 RX ORDER — METFORMIN HYDROCHLORIDE 500 MG/1
500 TABLET, COATED ORAL
Qty: 180 | Refills: 3 | Status: ACTIVE | COMMUNITY
Start: 2023-05-30 | End: 1900-01-01

## 2024-01-02 RX ORDER — MELOXICAM 15 MG/1
15 TABLET ORAL
Qty: 90 | Refills: 1 | Status: ACTIVE | COMMUNITY
Start: 2024-01-02 | End: 1900-01-01

## 2024-01-02 RX ORDER — AMLODIPINE BESYLATE 10 MG/1
10 TABLET ORAL DAILY
Qty: 90 | Refills: 3 | Status: ACTIVE | COMMUNITY
Start: 2018-10-10

## 2024-01-02 NOTE — HEALTH RISK ASSESSMENT
[Good] : ~his/her~  mood as  good [No falls in past year] : Patient reported no falls in the past year [0] : 2) Feeling down, depressed, or hopeless: Not at all (0) [PHQ-2 Negative - No further assessment needed] : PHQ-2 Negative - No further assessment needed [de-identified] : carly [de-identified] : ok [IEV3Leslg] : 0 [Patient reported colonoscopy was normal] : Patient reported colonoscopy was normal [HIV test declined] : HIV test declined [Hepatitis C test declined] : Hepatitis C test declined [Change in mental status noted] : No change in mental status noted [Language] : denies difficulty with language [Behavior] : denies difficulty with behavior [Learning/Retaining New Information] : denies difficulty learning/retaining new information [Handling Complex Tasks] : denies difficulty handling complex tasks [Reasoning] : denies difficulty with reasoning [None] : None [Employed] : employed [High School] : high school [Feels Safe at Home] : Feels safe at home [Fully functional (bathing, dressing, toileting, transferring, walking, feeding)] : Fully functional (bathing, dressing, toileting, transferring, walking, feeding) [Fully functional (using the telephone, shopping, preparing meals, housekeeping, doing laundry, using] : Fully functional and needs no help or supervision to perform IADLs (using the telephone, shopping, preparing meals, housekeeping, doing laundry, using transportation, managing medications and managing finances) [Reports changes in hearing] : Reports no changes in hearing [Reports changes in vision] : Reports no changes in vision [Reports changes in dental health] : Reports no changes in dental health [ColonoscopyDate] : 2018 [FreeTextEntry2] :  [Never] : Never

## 2024-01-17 PROBLEM — Z80.42 FAMILY HISTORY OF MALIGNANT NEOPLASM OF PROSTATE: Status: ACTIVE | Noted: 2024-01-17

## 2024-01-22 ENCOUNTER — APPOINTMENT (OUTPATIENT)
Dept: OTOLARYNGOLOGY | Facility: CLINIC | Age: 64
End: 2024-01-22
Payer: MEDICAID

## 2024-01-22 VITALS — BODY MASS INDEX: 40.9 KG/M2 | HEIGHT: 72 IN | WEIGHT: 302 LBS

## 2024-01-22 DIAGNOSIS — Z86.39 PERSONAL HISTORY OF OTHER ENDOCRINE, NUTRITIONAL AND METABOLIC DISEASE: ICD-10-CM

## 2024-01-22 DIAGNOSIS — E11.22 TYPE 2 DIABETES MELLITUS WITH DIABETIC CHRONIC KIDNEY DISEASE: ICD-10-CM

## 2024-01-22 DIAGNOSIS — Z80.42 FAMILY HISTORY OF MALIGNANT NEOPLASM OF PROSTATE: ICD-10-CM

## 2024-01-22 DIAGNOSIS — Z79.4 TYPE 2 DIABETES MELLITUS WITH DIABETIC CHRONIC KIDNEY DISEASE: ICD-10-CM

## 2024-01-22 DIAGNOSIS — Z86.79 PERSONAL HISTORY OF OTHER DISEASES OF THE CIRCULATORY SYSTEM: ICD-10-CM

## 2024-01-22 PROCEDURE — 99204 OFFICE O/P NEW MOD 45 MIN: CPT | Mod: 25

## 2024-01-22 PROCEDURE — 31575 DIAGNOSTIC LARYNGOSCOPY: CPT

## 2024-01-22 NOTE — PHYSICAL EXAM
[Normal] :  tongue is normal [Laryngoscopy Performed] : laryngoscopy was performed, see procedure section for findings

## 2024-01-22 NOTE — ASSESSMENT
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  -RTC sooner if any change in voice or new onset of symptoms  -He is in agreement with this plan

## 2024-01-22 NOTE — HISTORY OF PRESENT ILLNESS
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  Patient was referred by Dr. Channing Lara. patient have not did any scans and no biopsies.  Patient was first diagnosed by Dr. Enid Rodriguez phone number is [phone]. please call dad back at [phone].  Completed course of RT 5/2020 at [facility].  Patient reports intermittent hoarseness 1 month ago, worse in the morning.  Reports chronic nasal congestion, postnasal drip- worse in the fall/winter months  Currently using nasal lavage with temporary improvement  States swallowing has changed since completing RT.  Reports difficulty swallowing large pills. Tolerating regular diet and liquids.  Patient on Mounjaro since June (2023)- lost about 37lbs.  Using Omeprazole PRN with relief.  Former smoker- stopped about 15 years (0.5-1pack daily) Occasional alcohol. Occupation:

## 2024-02-12 ENCOUNTER — RX RENEWAL (OUTPATIENT)
Age: 64
End: 2024-02-12

## 2024-02-12 RX ORDER — LISINOPRIL 40 MG/1
40 TABLET ORAL
Qty: 90 | Refills: 4 | Status: ACTIVE | COMMUNITY
Start: 2018-10-01 | End: 1900-01-01

## 2024-02-22 NOTE — HISTORY OF PRESENT ILLNESS
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  He was initially evaluated on 1/22/24 for lesion previously seen on vocal cord by Dr. Lara, upon fiberoptic exam no lesion was visualized,   Patient was first diagnosed by Dr. Enid Rodriguez phone number is [phone]. please call dad back at [phone].  Completed course of RT 5/2020 at [facility].  Patient reports intermittent hoarseness 1 month ago, worse in the morning.  Reports chronic nasal congestion, postnasal drip- worse in the fall/winter months  Currently using nasal lavage with temporary improvement  States swallowing has changed since completing RT.  Reports difficulty swallowing large pills. Tolerating regular diet and liquids.  Patient on Mounjaro since June (2023)- lost about 37lbs.  Using Omeprazole PRN with relief.  Former smoker- stopped about 15 years (0.5-1pack daily) Occasional alcohol. Occupation:

## 2024-02-22 NOTE — PHYSICAL EXAM
[Normal] : tympanic membranes are normal in both ears [Laryngoscopy Performed] : laryngoscopy was performed, see procedure section for findings

## 2024-02-26 ENCOUNTER — APPOINTMENT (OUTPATIENT)
Dept: OTOLARYNGOLOGY | Facility: CLINIC | Age: 64
End: 2024-02-26

## 2024-06-06 RX ORDER — TIRZEPATIDE 7.5 MG/.5ML
7.5 INJECTION, SOLUTION SUBCUTANEOUS
Qty: 1 | Refills: 4 | Status: ACTIVE | COMMUNITY
Start: 2023-09-05 | End: 1900-01-01

## 2024-06-24 ENCOUNTER — APPOINTMENT (OUTPATIENT)
Dept: ORTHOPEDIC SURGERY | Facility: CLINIC | Age: 64
End: 2024-06-24
Payer: MEDICAID

## 2024-06-24 VITALS — HEIGHT: 72 IN | BODY MASS INDEX: 40.63 KG/M2 | WEIGHT: 300 LBS

## 2024-06-24 DIAGNOSIS — M17.0 BILATERAL PRIMARY OSTEOARTHRITIS OF KNEE: ICD-10-CM

## 2024-06-24 PROCEDURE — 20610 DRAIN/INJ JOINT/BURSA W/O US: CPT | Mod: LT

## 2024-06-24 PROCEDURE — 73564 X-RAY EXAM KNEE 4 OR MORE: CPT | Mod: LT,RT

## 2024-06-24 PROCEDURE — 99214 OFFICE O/P EST MOD 30 MIN: CPT | Mod: 25

## 2024-06-27 ENCOUNTER — APPOINTMENT (OUTPATIENT)
Dept: ORTHOPEDIC SURGERY | Facility: CLINIC | Age: 64
End: 2024-06-27
Payer: MEDICAID

## 2024-06-27 VITALS — SYSTOLIC BLOOD PRESSURE: 152 MMHG | HEART RATE: 108 BPM | DIASTOLIC BLOOD PRESSURE: 54 MMHG

## 2024-06-27 DIAGNOSIS — M46.96 UNSPECIFIED INFLAMMATORY SPONDYLOPATHY, LUMBAR REGION: ICD-10-CM

## 2024-06-27 PROCEDURE — 99214 OFFICE O/P EST MOD 30 MIN: CPT | Mod: 25

## 2024-06-27 PROCEDURE — 72110 X-RAY EXAM L-2 SPINE 4/>VWS: CPT

## 2024-07-05 ENCOUNTER — APPOINTMENT (OUTPATIENT)
Dept: MRI IMAGING | Facility: HOSPITAL | Age: 64
End: 2024-07-05

## 2024-07-08 ENCOUNTER — OUTPATIENT (OUTPATIENT)
Dept: OUTPATIENT SERVICES | Facility: HOSPITAL | Age: 64
LOS: 1 days | End: 2024-07-08
Payer: MEDICAID

## 2024-07-08 ENCOUNTER — RESULT REVIEW (OUTPATIENT)
Age: 64
End: 2024-07-08

## 2024-07-08 ENCOUNTER — APPOINTMENT (OUTPATIENT)
Dept: MRI IMAGING | Facility: CLINIC | Age: 64
End: 2024-07-08

## 2024-07-08 DIAGNOSIS — Z98.890 OTHER SPECIFIED POSTPROCEDURAL STATES: Chronic | ICD-10-CM

## 2024-07-08 DIAGNOSIS — K08.409 PARTIAL LOSS OF TEETH, UNSPECIFIED CAUSE, UNSPECIFIED CLASS: Chronic | ICD-10-CM

## 2024-07-08 DIAGNOSIS — M46.96 UNSPECIFIED INFLAMMATORY SPONDYLOPATHY, LUMBAR REGION: ICD-10-CM

## 2024-07-08 PROCEDURE — 72148 MRI LUMBAR SPINE W/O DYE: CPT | Mod: 26

## 2024-07-08 PROCEDURE — 72148 MRI LUMBAR SPINE W/O DYE: CPT

## 2024-07-29 ENCOUNTER — APPOINTMENT (OUTPATIENT)
Dept: ORTHOPEDIC SURGERY | Facility: CLINIC | Age: 64
End: 2024-07-29
Payer: MEDICAID

## 2024-07-29 VITALS — SYSTOLIC BLOOD PRESSURE: 158 MMHG | DIASTOLIC BLOOD PRESSURE: 85 MMHG | HEART RATE: 94 BPM

## 2024-07-29 DIAGNOSIS — M46.96 UNSPECIFIED INFLAMMATORY SPONDYLOPATHY, LUMBAR REGION: ICD-10-CM

## 2024-07-29 PROCEDURE — 99213 OFFICE O/P EST LOW 20 MIN: CPT

## 2024-07-29 NOTE — HISTORY OF PRESENT ILLNESS
[de-identified] : Hunter is a 63 year old male presenting today with back pain after a fall on Monday. States he fell and landed on his buttocks. He went to [place] [facility] and a CT scan was performed and negative for acute fracture. He c/o radicular pain into the right leg but no numbness. The pain is worse with sitting down, at worst 7/10. Denies any previous injury.  Works as , sitting painful.  No BBI SA.  6/27/24 interval - worsening lower back pain, wants to ensure nothing has shifted in his back after another fall.  PT and nsaid have not helped.  Radiates to buttocks  7/29 interval - MRI complete, persistent mid back pain, no radicular pain or bbi/sa

## 2024-07-29 NOTE — PHYSICAL EXAM
[de-identified] : Gen: NAD Resp: Nonlabored respirations Gait: Nl Head: CN I - XII grossly intact  Spine: Skin in tact, tender paraspinal region Full neck ROM UE: 5/5 D B T WE WF IO b/l SILT C4-T1 b/l (-)Bennie Andrew No hand atrophy 2+ rad bcr  LE: 5/5 IP Q HS EHL TA GS SILT L3-S1 b/l (-) clonus, (-) babinski (-) slr, c/l slr 2+ dp pt wwp bcr  [de-identified] : The following radiographs were ordered and read by me during this patient's visit. I reviewed each radiograph in detail with the patient and discussed the findings as highlighted below.   4 views of the lumbar spine were obtained today that show L4-L5 grade I spondy. There are no degenerative changes seen. There is no malalignment or dynamic instability. Preserved lordosis.  No obvious osseous abnormality. Otherwise unremarkable.   I independently reviewed and interpreted the MRI of the lumbar spine.  I discussed with the patient the MRI demonstrates multilevel DJD with foraminal stenosis with some central stenosis.

## 2024-07-29 NOTE — DISCUSSION/SUMMARY
[de-identified] : 63yM pw L4-L5 spondylolisthesis & worsening radicular pain, coccyxdynia has resolved.  Failed pt/nsaid. Moderate central and foraminal stenosis on MRI, will refer to spine partner for possible ROSA ISELA/eval. The patient was extensively counseled on treatment options including but not limited to observation, rest/activity modification, bracing, anti-inflammatory medications, physical therapy, injections, and surgery.  The natural history of the disease was thoroughly explained.   The patient will proceed with: -spine partner referral -PT -diclofenac rx provided - pt instructed to take with meals and not with other nsaid's including advil, aleve, and toradol.  The pt understands to stop taking the medication if any stomach sx develop or they develop any type of bleeding or bruising, at which point they will present to their PMD -pt was instructed on the importance of resting, icing and elevating to minimize swelling  I have personally obtained the history, reviewed the ROS as noted, and performed the physical examination today.  The patient and I discussed the assessment and options and developed the plan.  All questions were answered and the patient stated their understanding of the treatment plan and appreciation of the visit.  My cumulative time spent on this patient's visit included: Preparation for the visit, review of the medical records, review of pertinent diagnostic studies, examination and counseling of the patient on the above diagnosis, treatment plan and prognosis, orders of diagnostic tests, medications and/or appropriate procedures and documentation in the medical records of today's visit.   Edisno Silveira MD

## 2024-08-20 ENCOUNTER — OFFICE (OUTPATIENT)
Dept: URBAN - METROPOLITAN AREA CLINIC 27 | Facility: CLINIC | Age: 64
Setting detail: OPHTHALMOLOGY
End: 2024-08-20
Payer: COMMERCIAL

## 2024-08-20 DIAGNOSIS — H52.4: ICD-10-CM

## 2024-08-20 DIAGNOSIS — H02.89: ICD-10-CM

## 2024-08-20 DIAGNOSIS — H01.002: ICD-10-CM

## 2024-08-20 DIAGNOSIS — E11.9: ICD-10-CM

## 2024-08-20 DIAGNOSIS — H01.004: ICD-10-CM

## 2024-08-20 DIAGNOSIS — H25.13: ICD-10-CM

## 2024-08-20 DIAGNOSIS — H01.001: ICD-10-CM

## 2024-08-20 DIAGNOSIS — H01.005: ICD-10-CM

## 2024-08-20 PROBLEM — H52.7 REFRACTIVE ERROR: Status: ACTIVE | Noted: 2024-08-20

## 2024-08-20 PROCEDURE — 92015 DETERMINE REFRACTIVE STATE: CPT | Performed by: OPHTHALMOLOGY

## 2024-08-20 PROCEDURE — 92014 COMPRE OPH EXAM EST PT 1/>: CPT | Performed by: OPHTHALMOLOGY

## 2024-08-20 ASSESSMENT — CONFRONTATIONAL VISUAL FIELD TEST (CVF)
OD_FINDINGS: FULL
OS_FINDINGS: FULL

## 2024-08-20 ASSESSMENT — LID EXAM ASSESSMENTS
OS_BLEPHARITIS: LLL LUL 1+
OD_MEIBOMITIS: 1+
OS_MEIBOMITIS: 1+
OD_BLEPHARITIS: RLL RUL 1+

## 2024-09-27 ENCOUNTER — APPOINTMENT (OUTPATIENT)
Dept: INTERNAL MEDICINE | Facility: CLINIC | Age: 64
End: 2024-09-27
Payer: MEDICAID

## 2024-09-27 VITALS
OXYGEN SATURATION: 99 % | TEMPERATURE: 97.8 F | HEART RATE: 91 BPM | BODY MASS INDEX: 40.09 KG/M2 | WEIGHT: 296 LBS | RESPIRATION RATE: 16 BRPM | DIASTOLIC BLOOD PRESSURE: 84 MMHG | HEIGHT: 72 IN | SYSTOLIC BLOOD PRESSURE: 150 MMHG

## 2024-09-27 DIAGNOSIS — G43.909 MIGRAINE, UNSPECIFIED, NOT INTRACTABLE, W/OUT STATUS MIGRAINOSUS: ICD-10-CM

## 2024-09-27 PROCEDURE — 99213 OFFICE O/P EST LOW 20 MIN: CPT

## 2024-09-27 NOTE — HISTORY OF PRESENT ILLNESS
[FreeTextEntry8] : 65 y/o M here for acute care visit w/ cc of headaches  2 yrs ago, diagnosed with headaches, was having right sided headaches goes to right eye and back of neck no temple pain, jaw pain,  did have full eye test, which was normal.5 weeks ago w/ eye doctor/optrometrist.   just tylenol, for pain, 2 in morning, 2 in lunch, gin/tonic and marijuana,.  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  no photophobia,  no nausea, vomiting,  no muscle weakness, change of sensation,  no fevers or chills.  could not fit inside MRI machine

## 2024-09-27 NOTE — HISTORY OF PRESENT ILLNESS
[FreeTextEntry8] : 63 y/o M here for acute care visit w/ cc of headaches  2 yrs ago, diagnosed with headaches, was having right sided headaches goes to right eye and back of neck no temple pain, jaw pain,  did have full eye test, which was normal.5 weeks ago w/ eye doctor/optrometrist.   just tylenol, for pain, 2 in morning, 2 in lunch, gin/tonic and marijuana,.  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  no photophobia,  no nausea, vomiting,  no muscle weakness, change of sensation,  no fevers or chills.  could not fit inside MRI machine

## 2024-10-01 RX ORDER — ASPIRIN/ACETAMINOPHEN/CAFFEINE 250-250-65
250-250-65 TABLET ORAL 3 TIMES DAILY
Qty: 90 | Refills: 0 | Status: ACTIVE | COMMUNITY
Start: 2024-10-01 | End: 1900-01-01

## 2024-10-03 ENCOUNTER — OUTPATIENT (OUTPATIENT)
Dept: OUTPATIENT SERVICES | Facility: HOSPITAL | Age: 64
LOS: 1 days | End: 2024-10-03
Payer: MEDICAID

## 2024-10-03 ENCOUNTER — APPOINTMENT (OUTPATIENT)
Dept: CT IMAGING | Facility: HOSPITAL | Age: 64
End: 2024-10-03
Payer: MEDICAID

## 2024-10-03 DIAGNOSIS — G43.909 MIGRAINE, UNSPECIFIED, NOT INTRACTABLE, WITHOUT STATUS MIGRAINOSUS: ICD-10-CM

## 2024-10-03 DIAGNOSIS — Z98.890 OTHER SPECIFIED POSTPROCEDURAL STATES: Chronic | ICD-10-CM

## 2024-10-03 PROCEDURE — 70450 CT HEAD/BRAIN W/O DYE: CPT | Mod: 26

## 2024-10-03 PROCEDURE — 70450 CT HEAD/BRAIN W/O DYE: CPT

## 2024-10-08 RX ORDER — METOPROLOL TARTRATE 25 MG/1
25 TABLET ORAL TWICE DAILY
Qty: 60 | Refills: 2 | Status: ACTIVE | COMMUNITY
Start: 2024-10-08 | End: 1900-01-01

## 2024-10-22 ENCOUNTER — APPOINTMENT (OUTPATIENT)
Dept: INTERNAL MEDICINE | Facility: CLINIC | Age: 64
End: 2024-10-22

## 2024-10-24 ENCOUNTER — APPOINTMENT (OUTPATIENT)
Dept: NEUROLOGY | Facility: CLINIC | Age: 64
End: 2024-10-24
Payer: MEDICAID

## 2024-10-24 VITALS
DIASTOLIC BLOOD PRESSURE: 84 MMHG | HEART RATE: 91 BPM | OXYGEN SATURATION: 98 % | WEIGHT: 290 LBS | SYSTOLIC BLOOD PRESSURE: 161 MMHG | BODY MASS INDEX: 39.28 KG/M2 | HEIGHT: 72 IN

## 2024-10-24 DIAGNOSIS — G43.909 MIGRAINE, UNSPECIFIED, NOT INTRACTABLE, W/OUT STATUS MIGRAINOSUS: ICD-10-CM

## 2024-10-24 PROCEDURE — 99205 OFFICE O/P NEW HI 60 MIN: CPT

## 2024-10-24 PROCEDURE — G2211 COMPLEX E/M VISIT ADD ON: CPT | Mod: NC

## 2024-10-24 PROCEDURE — 99215 OFFICE O/P EST HI 40 MIN: CPT

## 2024-10-24 RX ORDER — RIZATRIPTAN BENZOATE 10 MG/1
10 TABLET ORAL
Qty: 15 | Refills: 1 | Status: ACTIVE | COMMUNITY
Start: 2024-10-24 | End: 1900-01-01

## 2024-11-04 ENCOUNTER — APPOINTMENT (OUTPATIENT)
Dept: NEUROLOGY | Facility: CLINIC | Age: 64
End: 2024-11-04

## 2024-11-13 ENCOUNTER — RX RENEWAL (OUTPATIENT)
Age: 64
End: 2024-11-13

## 2024-11-14 ENCOUNTER — APPOINTMENT (OUTPATIENT)
Dept: ORTHOPEDIC SURGERY | Facility: CLINIC | Age: 64
End: 2024-11-14

## 2024-11-18 ENCOUNTER — APPOINTMENT (OUTPATIENT)
Dept: ORTHOPEDIC SURGERY | Facility: CLINIC | Age: 64
End: 2024-11-18

## 2024-11-18 DIAGNOSIS — M54.2 CERVICALGIA: ICD-10-CM

## 2024-11-18 DIAGNOSIS — M50.30 OTHER CERVICAL DISC DEGENERATION, UNSPECIFIED CERVICAL REGION: ICD-10-CM

## 2024-11-18 DIAGNOSIS — G89.29 CERVICALGIA: ICD-10-CM

## 2024-11-18 DIAGNOSIS — R51.9 HEADACHE, UNSPECIFIED: ICD-10-CM

## 2024-11-18 PROCEDURE — 99214 OFFICE O/P EST MOD 30 MIN: CPT | Mod: 25

## 2024-11-18 PROCEDURE — 72050 X-RAY EXAM NECK SPINE 4/5VWS: CPT

## 2024-11-18 RX ORDER — METHOCARBAMOL 500 MG/1
500 TABLET, FILM COATED ORAL 3 TIMES DAILY
Qty: 30 | Refills: 2 | Status: ACTIVE | COMMUNITY
Start: 2024-11-18 | End: 1900-01-01

## 2024-11-21 ENCOUNTER — APPOINTMENT (OUTPATIENT)
Dept: NEUROLOGY | Facility: CLINIC | Age: 64
End: 2024-11-21

## 2024-11-27 ENCOUNTER — APPOINTMENT (OUTPATIENT)
Dept: NEUROLOGY | Facility: CLINIC | Age: 64
End: 2024-11-27

## 2024-11-27 RX ORDER — TIRZEPATIDE 7.5 MG/.5ML
7.5 INJECTION, SOLUTION SUBCUTANEOUS
Qty: 1 | Refills: 2 | Status: ACTIVE | COMMUNITY
Start: 2024-11-27 | End: 1900-01-01

## 2024-12-04 ENCOUNTER — APPOINTMENT (OUTPATIENT)
Dept: FAMILY MEDICINE | Facility: CLINIC | Age: 64
End: 2024-12-04

## 2025-03-13 ENCOUNTER — RX RENEWAL (OUTPATIENT)
Age: 65
End: 2025-03-13

## 2025-05-14 ENCOUNTER — APPOINTMENT (OUTPATIENT)
Age: 65
End: 2025-05-14
Payer: MEDICAID

## 2025-05-14 VITALS
HEIGHT: 72 IN | DIASTOLIC BLOOD PRESSURE: 78 MMHG | BODY MASS INDEX: 35.76 KG/M2 | TEMPERATURE: 98 F | OXYGEN SATURATION: 98 % | SYSTOLIC BLOOD PRESSURE: 136 MMHG | RESPIRATION RATE: 16 BRPM | HEART RATE: 89 BPM | WEIGHT: 264 LBS

## 2025-05-14 DIAGNOSIS — E66.9 OBESITY, UNSPECIFIED: ICD-10-CM

## 2025-05-14 DIAGNOSIS — I10 ESSENTIAL (PRIMARY) HYPERTENSION: ICD-10-CM

## 2025-05-14 DIAGNOSIS — E11.9 TYPE 2 DIABETES MELLITUS W/OUT COMPLICATIONS: ICD-10-CM

## 2025-05-14 PROCEDURE — 99214 OFFICE O/P EST MOD 30 MIN: CPT

## 2025-05-15 LAB
25(OH)D3 SERPL-MCNC: 59.9 NG/ML
ALBUMIN SERPL ELPH-MCNC: 4.7 G/DL
ALP BLD-CCNC: 103 U/L
ALT SERPL-CCNC: 31 U/L
ANION GAP SERPL CALC-SCNC: 23 MMOL/L
APPEARANCE: CLEAR
AST SERPL-CCNC: 35 U/L
BACTERIA: NEGATIVE /HPF
BILIRUB SERPL-MCNC: 0.5 MG/DL
BILIRUBIN URINE: NEGATIVE
BLOOD URINE: NEGATIVE
BUN SERPL-MCNC: 9 MG/DL
CALCIUM SERPL-MCNC: 9.9 MG/DL
CAST: 0 /LPF
CHLORIDE SERPL-SCNC: 102 MMOL/L
CHOLEST SERPL-MCNC: 243 MG/DL
CO2 SERPL-SCNC: 16 MMOL/L
COLOR: YELLOW
CREAT SERPL-MCNC: 0.73 MG/DL
CRP SERPL HS-MCNC: 7 MG/L
EGFRCR SERPLBLD CKD-EPI 2021: 102 ML/MIN/1.73M2
EPITHELIAL CELLS: 1 /HPF
GLUCOSE QUALITATIVE U: NEGATIVE MG/DL
GLUCOSE SERPL-MCNC: 86 MG/DL
HDLC SERPL-MCNC: 63 MG/DL
KETONES URINE: ABNORMAL MG/DL
LDLC SERPL-MCNC: 144 MG/DL
LEUKOCYTE ESTERASE URINE: ABNORMAL
MICROSCOPIC-UA: NORMAL
NITRITE URINE: NEGATIVE
NONHDLC SERPL-MCNC: 180 MG/DL
PH URINE: 5.5
POTASSIUM SERPL-SCNC: 6.1 MMOL/L
PROT SERPL-MCNC: 7.3 G/DL
PROTEIN URINE: NEGATIVE MG/DL
PSA SERPL-MCNC: 1.55 NG/ML
RED BLOOD CELLS URINE: 2 /HPF
SODIUM SERPL-SCNC: 141 MMOL/L
SPECIFIC GRAVITY URINE: 1.02
TRIGL SERPL-MCNC: 202 MG/DL
TSH SERPL-ACNC: 1.03 UIU/ML
UROBILINOGEN URINE: 1 MG/DL
WHITE BLOOD CELLS URINE: 0 /HPF

## 2025-05-19 ENCOUNTER — APPOINTMENT (OUTPATIENT)
Age: 65
End: 2025-05-19
Payer: MEDICAID

## 2025-05-19 VITALS
OXYGEN SATURATION: 98 % | HEIGHT: 72 IN | SYSTOLIC BLOOD PRESSURE: 140 MMHG | BODY MASS INDEX: 36.16 KG/M2 | TEMPERATURE: 97.5 F | DIASTOLIC BLOOD PRESSURE: 70 MMHG | RESPIRATION RATE: 16 BRPM | HEART RATE: 90 BPM | WEIGHT: 267 LBS

## 2025-05-19 DIAGNOSIS — R78.71 ABNORMAL LEAD LVL IN BLOOD: ICD-10-CM

## 2025-05-19 DIAGNOSIS — E78.5 HYPERLIPIDEMIA, UNSPECIFIED: ICD-10-CM

## 2025-05-19 DIAGNOSIS — M54.2 CERVICALGIA: ICD-10-CM

## 2025-05-19 DIAGNOSIS — G89.29 CERVICALGIA: ICD-10-CM

## 2025-05-19 PROCEDURE — 99214 OFFICE O/P EST MOD 30 MIN: CPT

## 2025-05-19 PROCEDURE — G0537: CPT

## 2025-05-20 PROBLEM — R78.71 ELEVATED BLOOD LEAD LEVEL: Status: ACTIVE | Noted: 2025-05-20

## 2025-05-22 LAB — LEAD BLD-MCNC: 31.6 UG/DL

## 2025-06-02 ENCOUNTER — NON-APPOINTMENT (OUTPATIENT)
Age: 65
End: 2025-06-02

## 2025-06-02 ENCOUNTER — APPOINTMENT (OUTPATIENT)
Age: 65
End: 2025-06-02
Payer: MEDICAID

## 2025-06-02 VITALS
WEIGHT: 266 LBS | BODY MASS INDEX: 36.03 KG/M2 | RESPIRATION RATE: 18 BRPM | DIASTOLIC BLOOD PRESSURE: 90 MMHG | TEMPERATURE: 97.5 F | OXYGEN SATURATION: 100 % | HEIGHT: 72 IN | SYSTOLIC BLOOD PRESSURE: 160 MMHG | HEART RATE: 95 BPM

## 2025-06-02 PROCEDURE — 99204 OFFICE O/P NEW MOD 45 MIN: CPT

## 2025-06-04 LAB
HCT VFR BLD CALC: 50.3 %
HGB BLD-MCNC: 16.7 G/DL
MCHC RBC-ENTMCNC: 30.5 PG
MCHC RBC-ENTMCNC: 33.2 G/DL
MCV RBC AUTO: 92 FL
PLATELET # BLD AUTO: 180 K/UL
RBC # BLD: 5.47 M/UL
RBC # FLD: 12.2 %
WBC # FLD AUTO: 8.18 K/UL

## 2025-06-09 LAB — ZPP BLD-MCNC: <50 MCG/DL

## 2025-06-26 ENCOUNTER — APPOINTMENT (OUTPATIENT)
Dept: ORTHOPEDIC SURGERY | Facility: CLINIC | Age: 65
End: 2025-06-26
Payer: MEDICAID

## 2025-06-26 VITALS — HEIGHT: 72 IN | BODY MASS INDEX: 36.03 KG/M2 | WEIGHT: 266 LBS

## 2025-06-26 PROCEDURE — 99214 OFFICE O/P EST MOD 30 MIN: CPT | Mod: 25

## 2025-06-26 PROCEDURE — 20610 DRAIN/INJ JOINT/BURSA W/O US: CPT | Mod: LT

## 2025-06-30 LAB
ALBUMIN SERPL ELPH-MCNC: 4.5 G/DL
ALP BLD-CCNC: 101 U/L
ALT SERPL-CCNC: 25 U/L
ANION GAP SERPL CALC-SCNC: 15 MMOL/L
AST SERPL-CCNC: 25 U/L
BILIRUB SERPL-MCNC: 0.4 MG/DL
BUN SERPL-MCNC: 15 MG/DL
CALCIUM SERPL-MCNC: 9.8 MG/DL
CHLORIDE SERPL-SCNC: 104 MMOL/L
CO2 SERPL-SCNC: 22 MMOL/L
CREAT SERPL-MCNC: 0.81 MG/DL
EGFRCR SERPLBLD CKD-EPI 2021: 98 ML/MIN/1.73M2
GLUCOSE SERPL-MCNC: 90 MG/DL
LEAD BLD-MCNC: 16.4 UG/DL
POTASSIUM SERPL-SCNC: 4.5 MMOL/L
PROT SERPL-MCNC: 7 G/DL
SODIUM SERPL-SCNC: 141 MMOL/L